# Patient Record
Sex: FEMALE | Race: WHITE | Employment: OTHER | URBAN - METROPOLITAN AREA
[De-identification: names, ages, dates, MRNs, and addresses within clinical notes are randomized per-mention and may not be internally consistent; named-entity substitution may affect disease eponyms.]

---

## 2017-04-18 ENCOUNTER — OFFICE VISIT (OUTPATIENT)
Dept: INFECTIOUS DISEASES | Age: 82
End: 2017-04-18

## 2017-04-18 VITALS
SYSTOLIC BLOOD PRESSURE: 122 MMHG | RESPIRATION RATE: 22 BRPM | HEIGHT: 64 IN | HEART RATE: 73 BPM | TEMPERATURE: 98 F | DIASTOLIC BLOOD PRESSURE: 76 MMHG

## 2017-04-18 DIAGNOSIS — L03.115 CELLULITIS OF RIGHT FOOT: Primary | ICD-10-CM

## 2017-04-18 PROCEDURE — 99213 OFFICE O/P EST LOW 20 MIN: CPT | Performed by: INTERNAL MEDICINE

## 2017-04-18 RX ORDER — AMOXICILLIN 500 MG/1
500 CAPSULE ORAL 3 TIMES DAILY
COMMUNITY
End: 2019-09-19

## 2017-04-18 ASSESSMENT — ENCOUNTER SYMPTOMS
GASTROINTESTINAL NEGATIVE: 1
RESPIRATORY NEGATIVE: 1

## 2017-04-21 LAB
ALBUMIN SERPL-MCNC: 2.7 G/DL
ALP BLD-CCNC: 61 U/L
ALT SERPL-CCNC: 14 U/L
AST SERPL-CCNC: 26 U/L
BILIRUB SERPL-MCNC: 0.5 MG/DL (ref 0.1–1.4)
BUN BLDV-MCNC: 7 MG/DL
CALCIUM SERPL-MCNC: 9.4 MG/DL
CHLORIDE BLD-SCNC: 103 MMOL/L
CO2: 28 MMOL/L
CREAT SERPL-MCNC: 0.56 MG/DL
GFR CALCULATED: NORMAL
GLUCOSE BLD-MCNC: 167 MG/DL
POTASSIUM SERPL-SCNC: 3.9 MMOL/L
SODIUM BLD-SCNC: 139 MMOL/L
TOTAL PROTEIN: 5.4

## 2017-05-02 ENCOUNTER — OFFICE VISIT (OUTPATIENT)
Dept: INFECTIOUS DISEASES | Age: 82
End: 2017-05-02

## 2017-05-02 VITALS
BODY MASS INDEX: 40.63 KG/M2 | HEIGHT: 64 IN | HEART RATE: 64 BPM | TEMPERATURE: 98.1 F | DIASTOLIC BLOOD PRESSURE: 63 MMHG | RESPIRATION RATE: 20 BRPM | WEIGHT: 238 LBS | SYSTOLIC BLOOD PRESSURE: 112 MMHG

## 2017-05-02 DIAGNOSIS — L03.115 CELLULITIS OF RIGHT FOOT: Primary | ICD-10-CM

## 2017-05-02 PROCEDURE — 99213 OFFICE O/P EST LOW 20 MIN: CPT | Performed by: INTERNAL MEDICINE

## 2017-05-02 ASSESSMENT — ENCOUNTER SYMPTOMS
RESPIRATORY NEGATIVE: 1
GASTROINTESTINAL NEGATIVE: 1

## 2017-05-16 ENCOUNTER — OFFICE VISIT (OUTPATIENT)
Dept: INFECTIOUS DISEASES | Age: 82
End: 2017-05-16

## 2017-05-16 VITALS
DIASTOLIC BLOOD PRESSURE: 59 MMHG | RESPIRATION RATE: 16 BRPM | SYSTOLIC BLOOD PRESSURE: 135 MMHG | WEIGHT: 241 LBS | TEMPERATURE: 98 F | HEART RATE: 66 BPM | HEIGHT: 64 IN | BODY MASS INDEX: 41.15 KG/M2

## 2017-05-16 DIAGNOSIS — L03.115 CELLULITIS OF RIGHT FOOT: Primary | ICD-10-CM

## 2017-05-16 PROCEDURE — 99212 OFFICE O/P EST SF 10 MIN: CPT | Performed by: INTERNAL MEDICINE

## 2017-05-16 ASSESSMENT — ENCOUNTER SYMPTOMS
GASTROINTESTINAL NEGATIVE: 1
RESPIRATORY NEGATIVE: 1

## 2017-06-29 ENCOUNTER — OFFICE VISIT (OUTPATIENT)
Dept: INFECTIOUS DISEASES | Age: 82
End: 2017-06-29

## 2017-06-29 VITALS
TEMPERATURE: 98.1 F | DIASTOLIC BLOOD PRESSURE: 65 MMHG | HEART RATE: 74 BPM | HEIGHT: 64 IN | RESPIRATION RATE: 20 BRPM | SYSTOLIC BLOOD PRESSURE: 115 MMHG

## 2017-06-29 DIAGNOSIS — L03.115 CELLULITIS OF RIGHT FOOT: Primary | ICD-10-CM

## 2017-06-29 PROCEDURE — 99213 OFFICE O/P EST LOW 20 MIN: CPT | Performed by: INTERNAL MEDICINE

## 2017-06-29 ASSESSMENT — ENCOUNTER SYMPTOMS
RESPIRATORY NEGATIVE: 1
GASTROINTESTINAL NEGATIVE: 1

## 2017-07-13 ENCOUNTER — OFFICE VISIT (OUTPATIENT)
Dept: INFECTIOUS DISEASES | Age: 82
End: 2017-07-13

## 2017-07-13 VITALS
TEMPERATURE: 98.8 F | RESPIRATION RATE: 20 BRPM | HEIGHT: 64 IN | HEART RATE: 61 BPM | DIASTOLIC BLOOD PRESSURE: 61 MMHG | SYSTOLIC BLOOD PRESSURE: 111 MMHG

## 2017-07-13 DIAGNOSIS — L03.115 CELLULITIS OF RIGHT FOOT: Primary | ICD-10-CM

## 2017-07-13 PROCEDURE — 99213 OFFICE O/P EST LOW 20 MIN: CPT | Performed by: INTERNAL MEDICINE

## 2017-07-13 ASSESSMENT — ENCOUNTER SYMPTOMS
RESPIRATORY NEGATIVE: 1
GASTROINTESTINAL NEGATIVE: 1

## 2017-07-20 ENCOUNTER — TELEPHONE (OUTPATIENT)
Dept: INFECTIOUS DISEASES | Age: 82
End: 2017-07-20

## 2017-07-27 ENCOUNTER — TELEPHONE (OUTPATIENT)
Dept: INFECTIOUS DISEASES | Age: 82
End: 2017-07-27

## 2017-08-03 ENCOUNTER — OFFICE VISIT (OUTPATIENT)
Dept: INFECTIOUS DISEASES | Age: 82
End: 2017-08-03

## 2017-08-03 VITALS
WEIGHT: 228 LBS | SYSTOLIC BLOOD PRESSURE: 123 MMHG | HEIGHT: 64 IN | HEART RATE: 64 BPM | DIASTOLIC BLOOD PRESSURE: 62 MMHG | RESPIRATION RATE: 18 BRPM | BODY MASS INDEX: 38.93 KG/M2 | TEMPERATURE: 97.7 F

## 2017-08-03 DIAGNOSIS — M86.272 SUBACUTE OSTEOMYELITIS OF LEFT FOOT (HCC): Primary | ICD-10-CM

## 2017-08-03 PROCEDURE — 99213 OFFICE O/P EST LOW 20 MIN: CPT | Performed by: INTERNAL MEDICINE

## 2017-08-03 RX ORDER — AMOXICILLIN AND CLAVULANATE POTASSIUM 875; 125 MG/1; MG/1
1 TABLET, FILM COATED ORAL 2 TIMES DAILY
Qty: 60 TABLET | Refills: 0 | Status: SHIPPED | OUTPATIENT
Start: 2017-08-03 | End: 2017-09-02

## 2017-08-03 ASSESSMENT — ENCOUNTER SYMPTOMS
RESPIRATORY NEGATIVE: 1
GASTROINTESTINAL NEGATIVE: 1

## 2017-09-14 ENCOUNTER — OFFICE VISIT (OUTPATIENT)
Dept: INFECTIOUS DISEASES | Age: 82
End: 2017-09-14

## 2017-09-14 VITALS
HEART RATE: 68 BPM | TEMPERATURE: 99.2 F | DIASTOLIC BLOOD PRESSURE: 66 MMHG | WEIGHT: 231.4 LBS | SYSTOLIC BLOOD PRESSURE: 111 MMHG | HEIGHT: 64 IN | RESPIRATION RATE: 20 BRPM | BODY MASS INDEX: 39.5 KG/M2

## 2017-09-14 DIAGNOSIS — M86.272 SUBACUTE OSTEOMYELITIS OF LEFT FOOT (HCC): Primary | ICD-10-CM

## 2017-09-14 PROCEDURE — 99212 OFFICE O/P EST SF 10 MIN: CPT | Performed by: INTERNAL MEDICINE

## 2017-09-14 RX ORDER — AMOXICILLIN AND CLAVULANATE POTASSIUM 875; 125 MG/1; MG/1
1 TABLET, FILM COATED ORAL 2 TIMES DAILY
Qty: 60 TABLET | Refills: 0 | Status: SHIPPED | OUTPATIENT
Start: 2017-09-14 | End: 2017-10-14

## 2017-09-14 ASSESSMENT — ENCOUNTER SYMPTOMS
RESPIRATORY NEGATIVE: 1
GASTROINTESTINAL NEGATIVE: 1

## 2017-09-25 ENCOUNTER — TELEPHONE (OUTPATIENT)
Dept: INFECTIOUS DISEASES | Age: 82
End: 2017-09-25

## 2017-10-02 ENCOUNTER — TELEPHONE (OUTPATIENT)
Dept: INFECTIOUS DISEASES | Age: 82
End: 2017-10-02

## 2017-10-02 NOTE — TELEPHONE ENCOUNTER
Patient states she will need Cataracts surgery, and will need medical clearance. She Will Be Re-checked for C-diff. Reusults are expected to go to PCP,  Of Dr Sammie Power. Dr Edgar Lucero to be updated as needed.

## 2017-11-03 ENCOUNTER — OFFICE VISIT (OUTPATIENT)
Dept: INFECTIOUS DISEASES | Age: 82
End: 2017-11-03

## 2017-11-03 VITALS
SYSTOLIC BLOOD PRESSURE: 132 MMHG | HEIGHT: 64 IN | WEIGHT: 225 LBS | TEMPERATURE: 98 F | RESPIRATION RATE: 22 BRPM | HEART RATE: 68 BPM | DIASTOLIC BLOOD PRESSURE: 64 MMHG | BODY MASS INDEX: 38.41 KG/M2

## 2017-11-03 DIAGNOSIS — S30.0XXD TRAUMATIC HEMATOMA OF BUTTOCK, SUBSEQUENT ENCOUNTER: ICD-10-CM

## 2017-11-03 DIAGNOSIS — L03.317 CELLULITIS OF BUTTOCK, RIGHT: Primary | ICD-10-CM

## 2017-11-03 PROCEDURE — 99213 OFFICE O/P EST LOW 20 MIN: CPT | Performed by: INTERNAL MEDICINE

## 2017-11-03 ASSESSMENT — ENCOUNTER SYMPTOMS
COLOR CHANGE: 0
RESPIRATORY NEGATIVE: 1
GASTROINTESTINAL NEGATIVE: 1

## 2019-09-12 ENCOUNTER — TELEPHONE (OUTPATIENT)
Dept: INFECTIOUS DISEASES | Age: 84
End: 2019-09-12

## 2019-09-12 RX ORDER — CLINDAMYCIN HYDROCHLORIDE 300 MG/1
300 CAPSULE ORAL 3 TIMES DAILY
Qty: 21 CAPSULE | Refills: 0 | Status: SHIPPED | OUTPATIENT
Start: 2019-09-12 | End: 2019-09-19

## 2019-09-12 NOTE — TELEPHONE ENCOUNTER
Per review with Dr Aristides Zee, patient was D/c on Augmentin. Change order to Clindamycin 300 mg, po, TID, #21  Have patient F/u in clinic. Return call to patient, advised to Stop the Augmentin and start  The Clyndamycin. Patient/Patient spouse verbalized understanding. Confirms [facility] on [address] is preferred Phx. Appt to F/u with Dr Aristides Zee is [phone] @ 11:45am.   E-Rx to be sent. Pt advised to check in 2 hours.

## 2019-09-19 ENCOUNTER — OFFICE VISIT (OUTPATIENT)
Dept: INFECTIOUS DISEASES | Age: 84
End: 2019-09-19
Payer: MEDICARE

## 2019-09-19 VITALS
DIASTOLIC BLOOD PRESSURE: 84 MMHG | BODY MASS INDEX: 43.91 KG/M2 | HEART RATE: 87 BPM | RESPIRATION RATE: 18 BRPM | SYSTOLIC BLOOD PRESSURE: 142 MMHG | HEIGHT: 63 IN | TEMPERATURE: 98.3 F | WEIGHT: 247.8 LBS

## 2019-09-19 DIAGNOSIS — L02.211 ABDOMINAL WALL ABSCESS: Primary | ICD-10-CM

## 2019-09-19 PROCEDURE — G8427 DOCREV CUR MEDS BY ELIG CLIN: HCPCS | Performed by: INTERNAL MEDICINE

## 2019-09-19 PROCEDURE — 4040F PNEUMOC VAC/ADMIN/RCVD: CPT | Performed by: INTERNAL MEDICINE

## 2019-09-19 PROCEDURE — G8419 CALC BMI OUT NRM PARAM NOF/U: HCPCS | Performed by: INTERNAL MEDICINE

## 2019-09-19 PROCEDURE — 1123F ACP DISCUSS/DSCN MKR DOCD: CPT | Performed by: INTERNAL MEDICINE

## 2019-09-19 PROCEDURE — 99213 OFFICE O/P EST LOW 20 MIN: CPT | Performed by: INTERNAL MEDICINE

## 2019-09-19 PROCEDURE — 1036F TOBACCO NON-USER: CPT | Performed by: INTERNAL MEDICINE

## 2019-09-19 PROCEDURE — 1090F PRES/ABSN URINE INCON ASSESS: CPT | Performed by: INTERNAL MEDICINE

## 2019-09-19 PROCEDURE — G8400 PT W/DXA NO RESULTS DOC: HCPCS | Performed by: INTERNAL MEDICINE

## 2019-09-19 RX ORDER — UREA 10 %
100 LOTION (ML) TOPICAL DAILY
COMMUNITY

## 2019-09-19 RX ORDER — AMITRIPTYLINE HYDROCHLORIDE 50 MG/1
50 TABLET, FILM COATED ORAL NIGHTLY
COMMUNITY

## 2019-09-19 RX ORDER — LANOLIN ALCOHOL/MO/W.PET/CERES
500 CREAM (GRAM) TOPICAL NIGHTLY
COMMUNITY

## 2019-09-19 RX ORDER — PANTOPRAZOLE SODIUM 40 MG/1
40 TABLET, DELAYED RELEASE ORAL DAILY
COMMUNITY

## 2019-09-19 RX ORDER — SPIRONOLACTONE 25 MG/1
25 TABLET ORAL DAILY
COMMUNITY

## 2019-09-19 RX ORDER — FENTANYL 75 UG/H
1 PATCH TRANSDERMAL
COMMUNITY
End: 2022-02-15

## 2019-09-19 RX ORDER — WARFARIN SODIUM 2 MG/1
2 TABLET ORAL
COMMUNITY

## 2019-09-19 RX ORDER — DONEPEZIL HYDROCHLORIDE 5 MG/1
5 TABLET, FILM COATED ORAL NIGHTLY
COMMUNITY
End: 2021-02-09 | Stop reason: DRUGHIGH

## 2019-09-19 ASSESSMENT — ENCOUNTER SYMPTOMS
ABDOMINAL PAIN: 1
RESPIRATORY NEGATIVE: 1

## 2019-11-08 ENCOUNTER — OFFICE VISIT (OUTPATIENT)
Dept: INFECTIOUS DISEASES | Age: 84
End: 2019-11-08
Payer: MEDICARE

## 2019-11-08 VITALS
DIASTOLIC BLOOD PRESSURE: 73 MMHG | HEART RATE: 68 BPM | TEMPERATURE: 97.9 F | HEIGHT: 63 IN | BODY MASS INDEX: 43.9 KG/M2 | SYSTOLIC BLOOD PRESSURE: 143 MMHG | RESPIRATION RATE: 18 BRPM

## 2019-11-08 DIAGNOSIS — I48.11 LONGSTANDING PERSISTENT ATRIAL FIBRILLATION (HCC): ICD-10-CM

## 2019-11-08 DIAGNOSIS — S70.12XD THIGH HEMATOMA, LEFT, SUBSEQUENT ENCOUNTER: ICD-10-CM

## 2019-11-08 DIAGNOSIS — L03.116 LEFT LEG CELLULITIS: Primary | ICD-10-CM

## 2019-11-08 PROCEDURE — G8417 CALC BMI ABV UP PARAM F/U: HCPCS | Performed by: INTERNAL MEDICINE

## 2019-11-08 PROCEDURE — G8400 PT W/DXA NO RESULTS DOC: HCPCS | Performed by: INTERNAL MEDICINE

## 2019-11-08 PROCEDURE — G8484 FLU IMMUNIZE NO ADMIN: HCPCS | Performed by: INTERNAL MEDICINE

## 2019-11-08 PROCEDURE — 1036F TOBACCO NON-USER: CPT | Performed by: INTERNAL MEDICINE

## 2019-11-08 PROCEDURE — 99213 OFFICE O/P EST LOW 20 MIN: CPT | Performed by: INTERNAL MEDICINE

## 2019-11-08 PROCEDURE — G8427 DOCREV CUR MEDS BY ELIG CLIN: HCPCS | Performed by: INTERNAL MEDICINE

## 2019-11-08 PROCEDURE — 4040F PNEUMOC VAC/ADMIN/RCVD: CPT | Performed by: INTERNAL MEDICINE

## 2019-11-08 PROCEDURE — 1123F ACP DISCUSS/DSCN MKR DOCD: CPT | Performed by: INTERNAL MEDICINE

## 2019-11-08 PROCEDURE — 1090F PRES/ABSN URINE INCON ASSESS: CPT | Performed by: INTERNAL MEDICINE

## 2019-11-08 ASSESSMENT — ENCOUNTER SYMPTOMS: COLOR CHANGE: 1

## 2020-02-24 PROBLEM — K25.9 GASTRIC ULCER, UNSPECIFIED AS ACUTE OR CHRONIC, WITHOUT HEMORRHAGE OR PERFORATION: Status: ACTIVE | Noted: 2018-09-03

## 2020-02-24 PROBLEM — I25.118 ATHSCL HEART DISEASE OF NATIVE COR ART W OTH ANG PCTRS (HCC): Status: ACTIVE | Noted: 2018-10-18

## 2020-02-24 PROBLEM — Z91.041 RADIOGRAPHIC DYE ALLERGY STATUS: Status: ACTIVE | Noted: 2018-10-22

## 2020-02-24 PROBLEM — E66.9 OBESITY, UNSPECIFIED: Status: ACTIVE | Noted: 2018-10-18

## 2020-02-24 PROBLEM — R21 RASH AND OTHER NONSPECIFIC SKIN ERUPTION: Status: ACTIVE | Noted: 2018-09-03

## 2020-02-24 PROBLEM — Z83.49 FAMILY HISTORY OF OTHER ENDOCRINE, NUTRITIONAL AND METABOLIC DISEASES: Status: ACTIVE | Noted: 2018-09-03

## 2020-02-24 PROBLEM — Q20.8 CARDIAC SEGMENTAL CONFIGURATION WITH ATRIAL CONFIGURATION UNKNOWN, VENTRICULAR CONFIGURATION UNKNOWN, AND INVERTED NORMALLY ALIGNED GREAT ARTERIES: Status: ACTIVE | Noted: 2018-10-20

## 2020-02-24 PROBLEM — I50.9 HEART FAILURE, UNSPECIFIED (HCC): Status: ACTIVE | Noted: 2018-10-22

## 2020-02-24 PROBLEM — N94.9 UNSPECIFIED CONDITION ASSOCIATED WITH FEMALE GENITAL ORGANS AND MENSTRUAL CYCLE: Status: ACTIVE | Noted: 2018-05-03

## 2020-02-24 PROBLEM — T82.855A STENOSIS OF CORONARY ARTERY STENT: Status: ACTIVE | Noted: 2018-10-22

## 2020-02-24 PROBLEM — Z82.49 FAMILY HISTORY OF ISCHEMIC HEART DISEASE AND OTHER DISEASES OF THE CIRCULATORY SYSTEM: Status: ACTIVE | Noted: 2018-10-18

## 2020-02-24 PROBLEM — Z45.018 ENCOUNTER FOR ADJUST AND MGMT OTH PRT CARDIAC PACEMAKER: Status: ACTIVE | Noted: 2018-09-11

## 2020-02-24 PROBLEM — Z96.653 PRESENCE OF ARTIFICIAL KNEE JOINT, BILATERAL: Status: ACTIVE | Noted: 2018-09-03

## 2020-02-24 PROBLEM — L29.9 PRURITUS, UNSPECIFIED: Status: ACTIVE | Noted: 2018-09-03

## 2020-02-24 PROBLEM — N28.9 DISORDER OF KIDNEY AND URETER, UNSPECIFIED: Status: ACTIVE | Noted: 2018-10-18

## 2020-02-24 PROBLEM — R82.998 OTHER ABNORMAL FINDINGS IN URINE: Status: ACTIVE | Noted: 2018-12-31

## 2020-02-24 PROBLEM — Z85.9 PERSONAL HISTORY OF MALIGNANT NEOPLASM, UNSPECIFIED: Status: ACTIVE | Noted: 2018-09-03

## 2020-02-24 PROBLEM — E66.01 MORBID (SEVERE) OBESITY DUE TO EXCESS CALORIES (HCC): Status: ACTIVE | Noted: 2018-10-22

## 2020-02-24 PROBLEM — R23.3 SPONTANEOUS ECCHYMOSES: Status: ACTIVE | Noted: 2018-09-27

## 2020-02-24 PROBLEM — M54.9 BACK PAIN: Status: ACTIVE | Noted: 2018-10-20

## 2020-02-24 PROBLEM — Z45.010 ENCOUNTER FOR CHECKING AND TESTING OF CARDIAC PACEMAKER PULSE GENERATOR (BATTERY): Status: ACTIVE | Noted: 2019-03-12

## 2020-02-24 PROBLEM — Z79.899 OTHER LONG TERM (CURRENT) DRUG THERAPY: Status: ACTIVE | Noted: 2018-12-03

## 2020-02-24 PROBLEM — L98.9 DISORDER OF THE SKIN AND SUBCUTANEOUS TISSUE, UNSPECIFIED: Status: ACTIVE | Noted: 2018-09-03

## 2020-02-24 PROBLEM — M54.9 DORSALGIA, UNSPECIFIED: Status: ACTIVE | Noted: 2018-10-18

## 2020-02-24 PROBLEM — G89.29 CHRONIC PAIN: Status: ACTIVE | Noted: 2018-09-03

## 2020-02-24 PROBLEM — Q20.9 CARDIAC SEGMENTAL CONFIGURATION WITH ATRIAL CONFIGURATION UNKNOWN, VENTRICULAR CONFIGURATION UNKNOWN, AND INVERTED NORMALLY ALIGNED GREAT ARTERIES: Status: ACTIVE | Noted: 2018-10-20

## 2020-02-24 PROBLEM — Z88.2 ALLERGY STATUS TO SULFONAMIDES STATUS: Status: ACTIVE | Noted: 2018-09-03

## 2020-02-24 PROBLEM — I25.10 ATHEROSCLEROSIS OF NATIVE CORONARY ARTERY OF NATIVE HEART WITHOUT ANGINA PECTORIS: Status: ACTIVE | Noted: 2018-10-18

## 2020-02-24 PROBLEM — M41.9 SCOLIOSIS, UNSPECIFIED: Status: ACTIVE | Noted: 2018-10-18

## 2020-02-24 PROBLEM — Z79.4 LONG TERM (CURRENT) USE OF INSULIN (HCC): Status: ACTIVE | Noted: 2018-10-22

## 2020-02-24 PROBLEM — M19.90 UNSPECIFIED OSTEOARTHRITIS, UNSPECIFIED SITE: Status: ACTIVE | Noted: 2018-10-18

## 2020-02-24 PROBLEM — Q89.8 OTHER SPECIFIED CONGENITAL MALFORMATIONS: Status: ACTIVE | Noted: 2018-09-03

## 2020-02-24 PROBLEM — I11.0 HYPERTENSIVE HEART DISEASE WITH HEART FAILURE (HCC): Status: ACTIVE | Noted: 2018-09-03

## 2020-02-24 PROBLEM — M25.569 PAIN IN UNSPECIFIED KNEE: Status: ACTIVE | Noted: 2018-05-03

## 2020-02-24 PROBLEM — G47.33 OBSTRUCTIVE SLEEP APNEA SYNDROME: Status: ACTIVE | Noted: 2018-10-18

## 2020-02-24 PROBLEM — Q05.9: Status: ACTIVE | Noted: 2018-10-18

## 2020-02-25 ENCOUNTER — OFFICE VISIT (OUTPATIENT)
Dept: NEUROLOGY | Age: 85
End: 2020-02-25
Payer: MEDICARE

## 2020-02-25 VITALS — SYSTOLIC BLOOD PRESSURE: 108 MMHG | DIASTOLIC BLOOD PRESSURE: 72 MMHG | BODY MASS INDEX: 42.09 KG/M2 | WEIGHT: 237.6 LBS

## 2020-02-25 PROBLEM — M54.16 LUMBAR RADICULOPATHY: Status: ACTIVE | Noted: 2020-02-25

## 2020-02-25 PROBLEM — R05.9 COUGH: Status: ACTIVE | Noted: 2019-01-22

## 2020-02-25 PROBLEM — R55 SYNCOPE AND COLLAPSE: Status: ACTIVE | Noted: 2019-03-12

## 2020-02-25 PROBLEM — Z91.89 AT RISK FOR IMPAIRED SKIN INTEGRITY: Status: ACTIVE | Noted: 2020-02-25

## 2020-02-25 PROBLEM — R07.2 PRECORDIAL PAIN: Status: ACTIVE | Noted: 2018-10-18

## 2020-02-25 PROBLEM — Z96.1 PSEUDOPHAKIA: Status: ACTIVE | Noted: 2018-06-15

## 2020-02-25 PROBLEM — Z98.890 HISTORY OF MOHS MICROGRAPHIC SURGERY FOR SKIN CANCER: Status: ACTIVE | Noted: 2018-07-12

## 2020-02-25 PROBLEM — Z86.79 HISTORY OF ATRIAL FIBRILLATION: Status: ACTIVE | Noted: 2018-06-08

## 2020-02-25 PROBLEM — I48.0 PAROXYSMAL ATRIAL FIBRILLATION (HCC): Status: ACTIVE | Noted: 2019-03-12

## 2020-02-25 PROBLEM — H50.15 ALTERNATING EXOTROPIA: Status: ACTIVE | Noted: 2017-09-25

## 2020-02-25 PROBLEM — E11.9 TYPE 2 DIABETES MELLITUS WITHOUT RETINOPATHY (HCC): Status: ACTIVE | Noted: 2017-09-25

## 2020-02-25 PROBLEM — L70.0 CLOSED COMEDONE: Status: ACTIVE | Noted: 2017-04-27

## 2020-02-25 PROBLEM — R41.3 MEMORY LOSS: Status: ACTIVE | Noted: 2020-02-25

## 2020-02-25 PROBLEM — H43.813 POSTERIOR VITREOUS DETACHMENT OF BOTH EYES: Status: ACTIVE | Noted: 2017-09-25

## 2020-02-25 PROBLEM — H02.833 DERMATOCHALASIS OF EYELIDS OF BOTH EYES: Status: ACTIVE | Noted: 2017-09-25

## 2020-02-25 PROBLEM — Z91.81 HISTORY OF FALLING: Status: ACTIVE | Noted: 2018-05-23

## 2020-02-25 PROBLEM — G62.9 PERIPHERAL NEUROPATHY: Status: ACTIVE | Noted: 2020-02-25

## 2020-02-25 PROBLEM — Z79.01 ANTICOAGULATED ON COUMADIN: Status: ACTIVE | Noted: 2018-06-08

## 2020-02-25 PROBLEM — Z85.828 HISTORY OF MOHS MICROGRAPHIC SURGERY FOR SKIN CANCER: Status: ACTIVE | Noted: 2018-07-12

## 2020-02-25 PROBLEM — Z95.0 PRESENCE OF CARDIAC PACEMAKER: Status: ACTIVE | Noted: 2018-06-08

## 2020-02-25 PROBLEM — G31.84 MILD COGNITIVE IMPAIRMENT WITH MEMORY LOSS: Status: ACTIVE | Noted: 2020-02-25

## 2020-02-25 PROBLEM — H02.836 DERMATOCHALASIS OF EYELIDS OF BOTH EYES: Status: ACTIVE | Noted: 2017-09-25

## 2020-02-25 PROBLEM — E87.6 HYPOKALEMIA: Status: ACTIVE | Noted: 2018-12-24

## 2020-02-25 PROBLEM — Z72.0 CURRENT TOBACCO USE: Status: ACTIVE | Noted: 2020-02-25

## 2020-02-25 PROCEDURE — G8427 DOCREV CUR MEDS BY ELIG CLIN: HCPCS | Performed by: PSYCHIATRY & NEUROLOGY

## 2020-02-25 PROCEDURE — G8417 CALC BMI ABV UP PARAM F/U: HCPCS | Performed by: PSYCHIATRY & NEUROLOGY

## 2020-02-25 PROCEDURE — 1090F PRES/ABSN URINE INCON ASSESS: CPT | Performed by: PSYCHIATRY & NEUROLOGY

## 2020-02-25 PROCEDURE — 99214 OFFICE O/P EST MOD 30 MIN: CPT | Performed by: PSYCHIATRY & NEUROLOGY

## 2020-02-25 PROCEDURE — G8400 PT W/DXA NO RESULTS DOC: HCPCS | Performed by: PSYCHIATRY & NEUROLOGY

## 2020-02-25 PROCEDURE — 4040F PNEUMOC VAC/ADMIN/RCVD: CPT | Performed by: PSYCHIATRY & NEUROLOGY

## 2020-02-25 PROCEDURE — 1036F TOBACCO NON-USER: CPT | Performed by: PSYCHIATRY & NEUROLOGY

## 2020-02-25 PROCEDURE — G8484 FLU IMMUNIZE NO ADMIN: HCPCS | Performed by: PSYCHIATRY & NEUROLOGY

## 2020-02-25 PROCEDURE — 1123F ACP DISCUSS/DSCN MKR DOCD: CPT | Performed by: PSYCHIATRY & NEUROLOGY

## 2020-02-25 RX ORDER — NIACIN 500 MG/1
500 TABLET, EXTENDED RELEASE ORAL
COMMUNITY
Start: 2017-04-24

## 2020-02-25 ASSESSMENT — ENCOUNTER SYMPTOMS
TROUBLE SWALLOWING: 0
SHORTNESS OF BREATH: 0
PHOTOPHOBIA: 0
VOMITING: 0
NAUSEA: 0
CHOKING: 0
BACK PAIN: 1

## 2020-02-25 NOTE — PROGRESS NOTES
Subjective:      Patient ID: Karol Damian is a 82 y.o. female who presents today for:  Chief Complaint   Patient presents with    6 Month Follow-Up     Memory is not doing good. Neuropathy isnt going to well either her feet are still having the numbness feeling and to the point the feel like they are pounding.  Other     patient is going through some rough times she has found out her son has cancer. HPI 82 right-handed female with  history of memory loss  And lumbarradiculopathy and PN . Slowly continues to decline over time. 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 Patient was on high-dose Duragesic patch 100 mcg and high dose of gabapentin which may be contributing to some of the cognition in this age group. Since she is no longer a Duragesic patch. She is on some pain medication but does not appear to be narcotics. She has not declined. She has good days and bad days. She still requires much help at home intermittently. She has not any falls but her legs become weaker and weaker from her neuropathy. Her sugars are better controlled. She is not any hospitalizations of recent. No past medical history on file. No past surgical history on file.   Social History     Socioeconomic History    Marital status:      Spouse name: Not on file    Number of children: Not on file    Years of education: Not on file    Highest education level: Not on file   Occupational History    Not on file   Social Needs    Financial resource strain: Not on file    Food insecurity:     Worry: Not on file     Inability: Not on file    Transportation needs:     Medical: Not on file     Non-medical: Not on file   Tobacco Use    Smoking status: Never Smoker    Smokeless tobacco: Never Used   Substance and Sexual Activity    Alcohol use: Not on file    Drug use: Not on file    Sexual activity: Not on file   Lifestyle    Physical activity:     Days per week: hallucinations and sleep disturbance. Objective:   /72 (Site: Right Upper Arm, Position: Sitting, Cuff Size: Large Adult)   Wt 237 lb 9.6 oz (107.8 kg)   BMI 42.09 kg/m²     Physical Exam  Vitals signs reviewed. Eyes:      Pupils: Pupils are equal, round, and reactive to light. Neck:      Musculoskeletal: Normal range of motion. Cardiovascular:      Rate and Rhythm: Normal rate and regular rhythm. Heart sounds: No murmur. Pulmonary:      Effort: Pulmonary effort is normal.      Breath sounds: Normal breath sounds. Musculoskeletal: Normal range of motion. Neurological:      Mental Status: She is alert and oriented to person, place, and time. Cranial Nerves: No cranial nerve deficit. Sensory: No sensory deficit. Motor: No abnormal muscle tone. Coordination: Coordination normal.      Deep Tendon Reflexes: Reflexes are normal and symmetric. Babinski sign absent on the right side. Babinski sign absent on the left side. Addition to the above patient has 2+ pedal edema she is areflexic in the lower extremity lower extremity strength is 4/5 she walks with a walker. She is no vision in the left eye. No results found.     Lab Results   Component Value Date    WBC 8.3 10/23/2019    RBC 4.19 10/23/2019    HGB 13.9 10/23/2019    HCT 41.8 10/23/2019    MCV 99.7 10/23/2019    MCH 33.3 10/23/2019    MCHC 33.4 10/23/2019    RDW 13.1 10/23/2019     10/23/2019     Lab Results   Component Value Date     10/23/2019    K 4.0 10/23/2019    CL 97 10/23/2019    CO2 24 10/23/2019    BUN 16 10/23/2019    CREATININE 0.76 10/23/2019    GFRAA >60.0 10/23/2019    LABGLOM >60.0 10/23/2019    GLUCOSE 272 10/23/2019    LABALBU 2.7 04/21/2017    CALCIUM 9.9 10/23/2019    BILITOT 0.5 04/21/2017    ALKPHOS 61 04/21/2017    AST 26 04/21/2017    ALT 14 04/21/2017     Lab Results   Component Value Date    PROTIME 23.8 10/30/2019    INR 2.0 10/30/2019     No results found for: TSH,

## 2020-03-26 PROBLEM — R05.9 COUGH: Status: RESOLVED | Noted: 2019-01-22 | Resolved: 2020-03-26

## 2020-08-25 ENCOUNTER — OFFICE VISIT (OUTPATIENT)
Dept: NEUROLOGY | Age: 85
End: 2020-08-25
Payer: MEDICARE

## 2020-08-25 VITALS — DIASTOLIC BLOOD PRESSURE: 70 MMHG | SYSTOLIC BLOOD PRESSURE: 130 MMHG

## 2020-08-25 PROBLEM — R26.0 ATAXIC GAIT: Status: ACTIVE | Noted: 2020-08-25

## 2020-08-25 PROCEDURE — 99214 OFFICE O/P EST MOD 30 MIN: CPT | Performed by: PSYCHIATRY & NEUROLOGY

## 2020-08-25 PROCEDURE — 4040F PNEUMOC VAC/ADMIN/RCVD: CPT | Performed by: PSYCHIATRY & NEUROLOGY

## 2020-08-25 PROCEDURE — 1123F ACP DISCUSS/DSCN MKR DOCD: CPT | Performed by: PSYCHIATRY & NEUROLOGY

## 2020-08-25 PROCEDURE — 1090F PRES/ABSN URINE INCON ASSESS: CPT | Performed by: PSYCHIATRY & NEUROLOGY

## 2020-08-25 PROCEDURE — G8400 PT W/DXA NO RESULTS DOC: HCPCS | Performed by: PSYCHIATRY & NEUROLOGY

## 2020-08-25 PROCEDURE — 1036F TOBACCO NON-USER: CPT | Performed by: PSYCHIATRY & NEUROLOGY

## 2020-08-25 PROCEDURE — G8427 DOCREV CUR MEDS BY ELIG CLIN: HCPCS | Performed by: PSYCHIATRY & NEUROLOGY

## 2020-08-25 PROCEDURE — G8417 CALC BMI ABV UP PARAM F/U: HCPCS | Performed by: PSYCHIATRY & NEUROLOGY

## 2020-08-25 RX ORDER — OXYCODONE HYDROCHLORIDE 15 MG/1
TABLET ORAL
COMMUNITY
Start: 2020-08-18 | End: 2021-02-09 | Stop reason: DRUGHIGH

## 2020-08-25 RX ORDER — BLOOD SUGAR DIAGNOSTIC
STRIP MISCELLANEOUS
COMMUNITY
Start: 2020-06-30

## 2020-08-25 RX ORDER — ROSUVASTATIN CALCIUM 5 MG/1
TABLET, COATED ORAL
COMMUNITY
Start: 2020-07-13

## 2020-08-25 RX ORDER — MEMANTINE HYDROCHLORIDE 5 MG/1
5 TABLET ORAL 2 TIMES DAILY
Qty: 60 TABLET | Refills: 3 | Status: SHIPPED | OUTPATIENT
Start: 2020-08-25 | End: 2020-12-28 | Stop reason: SDUPTHER

## 2020-08-25 RX ORDER — TRIAMCINOLONE ACETONIDE 1 MG/G
CREAM TOPICAL
COMMUNITY
Start: 2019-11-21

## 2020-08-25 ASSESSMENT — ENCOUNTER SYMPTOMS
CHOKING: 0
VOMITING: 0
SHORTNESS OF BREATH: 0
TROUBLE SWALLOWING: 0
PHOTOPHOBIA: 0
NAUSEA: 0
COLOR CHANGE: 0
BACK PAIN: 0

## 2020-08-25 NOTE — PROGRESS NOTES
Subjective:      Patient ID: Fede Corrales is a 82 y.o. female who presents today for:  Chief Complaint   Patient presents with    Follow-up     Patient states she feels like her memory is getting worse. She will try to talk to people and she will have a hard time getting the words out and other times she just doesnt know what she is going to say. She is forgetting names more often as well. HPI 82 a right-handed female history of mild cognitive impairment dementia. The patient actually is now entering a phase of early dementia with some worsening. Patient is only on Aricept. She also has sleep apnea which may be contributing to some of her memory issues as we have seen in the of her patients. Patient is still able to perform her activities of daily living. Issues appears to mostly medical she is declining. She is on Coumadin and had some blood vessels in her eyes that may have broken. She still able to see there is mild degree of chemosis. He walks with a cane. She has neuropathy and has Charcot points as well. She walks with a cane she has not had any falls injuries trauma no bleeding bruising choking drooling    No past medical history on file. No past surgical history on file.   Social History     Socioeconomic History    Marital status:      Spouse name: Not on file    Number of children: Not on file    Years of education: Not on file    Highest education level: Not on file   Occupational History    Not on file   Social Needs    Financial resource strain: Not on file    Food insecurity     Worry: Not on file     Inability: Not on file    Transportation needs     Medical: Not on file     Non-medical: Not on file   Tobacco Use    Smoking status: Never Smoker    Smokeless tobacco: Never Used   Substance and Sexual Activity    Alcohol use: Not on file    Drug use: Not on file    Sexual activity: Not on file   Lifestyle    Physical activity     Days per week: Not on file Minutes per session: Not on file    Stress: Not on file   Relationships    Social connections     Talks on phone: Not on file     Gets together: Not on file     Attends [employer] service: Not on file     Active member of club or organization: Not on file     Attends meetings of clubs or organizations: Not on file     Relationship status: Not on file    Intimate partner violence     Fear of current or ex partner: Not on file     Emotionally abused: Not on file     Physically abused: Not on file     Forced sexual activity: Not on file   Other Topics Concern    Not on file   Social History Narrative    Not on file     No family history on file. Allergies   Allergen Reactions    Amlodipine Besylate Other (See Comments)     chest pain    Amoxicillin     Cephalexin      Other reaction(s): Unknown    Clobetasol     Codeine      Other reaction(s): GI Upset    Diltiazem     Dipivefrin     Dye [Iodides]     Iodine     Lipitor [Atorvastatin]     Lisinopril     Lotensin [Benazepril Hcl]     Lovastatin     Mobic     Morphine      Other reaction(s): GI Upset    Naproxen     Nickel     Nifedipine     Novolin 70-30 [Insulin Nph Isophane & Regular]     Sulfa Antibiotics     Tramadol     Vancomycin     Vioxx [Rofecoxib]     Cephalosporins Rash       Current Outpatient Medications   Medication Sig Dispense Refill    rosuvastatin (CRESTOR) 5 MG tablet       triamcinolone (KENALOG) 0.1 % cream Apply to lower legs twice daily and cover with moisturizer.       CPAP Machine MISC 13 cm      niacin (NIASPAN) 500 MG extended release tablet Take 500 mg by mouth      vitamin B-12 (CYANOCOBALAMIN) 100 MCG tablet Take 100 mcg by mouth daily      warfarin (COUMADIN) 2 MG tablet Take 2 mg by mouth      insulin 70-30 (NOVOLIN 70/30) (70-30) 100 UNIT per ML injection vial Inject into the skin 2 times daily      pantoprazole (PROTONIX) 40 MG tablet Take 40 mg by mouth daily      niacin (SLO-NIACIN) 500 MG extended release tablet Take 500 mg by mouth nightly      Probiotic Product (PROBIOTIC DAILY PO) Take by mouth      amitriptyline (ELAVIL) 50 MG tablet Take 50 mg by mouth nightly      donepezil (ARICEPT) 5 MG tablet Take 5 mg by mouth nightly      spironolactone (ALDACTONE) 25 MG tablet Take 25 mg by mouth daily      potassium chloride SA (K-DUR;KLOR-CON) 20 MEQ tablet Take 20 mEq by mouth 2 times daily.  furosemide (LASIX) 40 MG tablet Take 80 mg by mouth 2 times daily       clopidogrel (PLAVIX) 75 MG tablet Take 75 mg by mouth daily.  metoprolol (TOPROL-XL) 100 MG XL tablet Take 100 mg by mouth daily.  gabapentin (NEURONTIN) 400 MG capsule Take 400 mg by mouth 3 times daily.  metolazone (ZAROXOLYN) 5 MG tablet Take 5 mg by mouth daily.  Lactobacillus (ACIDOPHILUS PO) Take  by mouth.  Cholecalciferol (VITAMIN D3) 1000 UNITS TABS Take  by mouth.  nitroGLYCERIN (NITROSTAT) 0.4 MG SL tablet Place 0.4 mg under the tongue every 5 minutes as needed.  insulin NPH (HUMULIN N) 100 UNIT/ML injection Inject  into the skin 2 times daily (before meals).  ONETOUCH ULTRA strip TEST 4 TIMES A DAY      oxyCODONE (OXY-IR) 15 MG immediate release tablet       fentaNYL (DURAGESIC) 75 MCG/HR Place 1 patch onto the skin every 72 hours.  aspirin 81 MG tablet Take 81 mg by mouth daily. No current facility-administered medications for this visit. Review of Systems   Constitutional: Negative for fever. HENT: Negative for ear pain, tinnitus and trouble swallowing. Eyes: Negative for photophobia and visual disturbance. Respiratory: Negative for choking and shortness of breath. Cardiovascular: Negative for chest pain and palpitations. Gastrointestinal: Negative for nausea and vomiting. Musculoskeletal: Negative for back pain, gait problem, joint swelling, myalgias, neck pain and neck stiffness. Skin: Negative for color change. Allergic/Immunologic: Negative for food allergies. Neurological: Negative for dizziness, tremors, seizures, syncope, facial asymmetry, speech difficulty, weakness, light-headedness, numbness and headaches. Psychiatric/Behavioral: Negative for behavioral problems, confusion, hallucinations and sleep disturbance. Objective:   /70 (Site: Left Upper Arm, Position: Sitting, Cuff Size: Large Adult)     Physical Exam  Vitals signs reviewed. Eyes:      Pupils: Pupils are equal, round, and reactive to light. Neck:      Musculoskeletal: Normal range of motion. Cardiovascular:      Rate and Rhythm: Normal rate and regular rhythm. Heart sounds: No murmur. Pulmonary:      Effort: Pulmonary effort is normal.      Breath sounds: Normal breath sounds. Abdominal:      General: Bowel sounds are normal.   Musculoskeletal: Normal range of motion. Skin:     General: Skin is warm. Neurological:      Mental Status: She is alert and oriented to person, place, and time. Cranial Nerves: No cranial nerve deficit. Sensory: No sensory deficit. Motor: Weakness present. No abnormal muscle tone. Coordination: Coordination normal.      Deep Tendon Reflexes: Babinski sign absent on the right side. Babinski sign absent on the left side. Reflex Scores:       Tricep reflexes are 0 on the right side and 0 on the left side. Bicep reflexes are 0 on the right side and 0 on the left side. Brachioradialis reflexes are 0 on the right side and 0 on the left side. Patellar reflexes are 0 on the right side and 0 on the left side. Achilles reflexes are 0 on the right side and 0 on the left side. Psychiatric:         Mood and Affect: Mood normal.       Walks with a cane and she is completely areflexic in the lower extremities some myopathy changes with proximal muscle weakness no results found.     Lab Results   Component Value Date    WBC 8.3 10/23/2019    RBC 4.19 10/23/2019 HGB 13.9 10/23/2019    HCT 41.8 10/23/2019    MCV 99.7 10/23/2019    MCH 33.3 10/23/2019    MCHC 33.4 10/23/2019    RDW 13.1 10/23/2019     10/23/2019     Lab Results   Component Value Date     10/23/2019    K 4.0 10/23/2019    CL 97 10/23/2019    CO2 24 10/23/2019    BUN 16 10/23/2019    CREATININE 0.76 10/23/2019    GFRAA >60.0 10/23/2019    LABGLOM >60.0 10/23/2019    GLUCOSE 272 10/23/2019    LABALBU 2.7 04/21/2017    CALCIUM 9.9 10/23/2019    BILITOT 0.5 04/21/2017    ALKPHOS 61 04/21/2017    AST 26 04/21/2017    ALT 14 04/21/2017     Lab Results   Component Value Date    PROTIME 23.8 10/30/2019    INR 2.0 10/30/2019     No results found for: TSH, [id number], FOLATE, FERRITIN, IRON, TIBC, PTRFSAT, TSH, FREET4  No results found for: TRIG, HDL, LDLCALC, LDLDIRECT, LABVLDL  No results found for: LABAMPH, BARBSCNU, LABBENZ, CANNAB, COCAINESCRN, LABMETH, OPIATESCREENURINE, PHENCYCLIDINESCREENURINE, PPXUR, ETOH  No results found for: LITHIUM, DILFRTOT, VALPROATE    Assessment:       Diagnosis Orders   1. Mild cognitive impairment with memory loss     2. Ataxic gait     3. Lumbar radiculopathy     4. Diabetic polyneuropathy associated with type 1 diabetes mellitus (HCC)       Mild Cognitive impairment going into early dementia. Patient is worsening terms of her memory but her functional status has remained normal.  Is time to add Namenda to the Aricept. We will keep her on the lowest doses for now given that she is on polypharmacy. Patient has significant peripheral neuropathy with gait ataxia and Charcot joints. She has diabetes she walks with a cane she is not any falls injuries or trauma. Her  helps her to some degree but mostly she can do things on herself except the medication part of it. Main issues appears to be sleep apnea she does not sleep very well and is likely affecting her memory as well.   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 Plan:      No orders of the defined types were placed in this encounter. No orders of the defined types were placed in this encounter. No follow-ups on file.       Braxton Clinton MD

## 2020-12-28 RX ORDER — MEMANTINE HYDROCHLORIDE 5 MG/1
5 TABLET ORAL 2 TIMES DAILY
Qty: 60 TABLET | Refills: 3 | Status: SHIPPED | OUTPATIENT
Start: 2020-12-28 | End: 2022-09-12 | Stop reason: SDUPTHER

## 2020-12-28 NOTE — TELEPHONE ENCOUNTER
Requested Prescriptions     Pending Prescriptions Disp Refills    memantine (NAMENDA) 5 MG tablet 60 tablet 3     Sig: Take 1 tablet by mouth 2 times daily

## 2021-02-05 PROBLEM — M25.569 KNEE PAIN: Status: ACTIVE | Noted: 2021-02-05

## 2021-02-05 PROBLEM — Z87.42 HISTORY OF RECURRENT VAGINAL DISCHARGE: Status: ACTIVE | Noted: 2021-02-05

## 2021-02-05 PROBLEM — Z87.19 H/O: GASTROINTESTINAL DISEASE: Status: ACTIVE | Noted: 2021-02-05

## 2021-02-05 PROBLEM — L30.9 DERMATITIS OF VULVA: Status: ACTIVE | Noted: 2021-02-05

## 2021-02-05 PROBLEM — R82.90 FOUL SMELLING URINE: Status: ACTIVE | Noted: 2018-12-31

## 2021-02-05 PROBLEM — H53.9 DISORDER OF VISION: Status: ACTIVE | Noted: 2021-02-05

## 2021-02-05 PROBLEM — N90.5 ATROPHIC VULVA: Status: ACTIVE | Noted: 2021-02-05

## 2021-02-05 PROBLEM — K31.84 GASTROPARESIS: Status: ACTIVE | Noted: 2021-02-05

## 2021-02-05 PROBLEM — K64.9 HEMORRHOIDS: Status: ACTIVE | Noted: 2021-02-05

## 2021-02-05 PROBLEM — G89.4 CHRONIC PAIN DISORDER: Status: ACTIVE | Noted: 2018-10-18

## 2021-02-05 PROBLEM — R49.0 CHRONIC HOARSENESS: Status: ACTIVE | Noted: 2021-02-05

## 2021-02-05 PROBLEM — J44.9 MILD CHRONIC OBSTRUCTIVE PULMONARY DISEASE (HCC): Status: ACTIVE | Noted: 2018-10-22

## 2021-02-05 PROBLEM — M86.9 OSTEOMYELITIS OF LEFT FOOT (HCC): Status: ACTIVE | Noted: 2021-02-05

## 2021-02-05 PROBLEM — I25.10 ARTERIOSCLEROSIS OF CORONARY ARTERY: Status: ACTIVE | Noted: 2018-10-18

## 2021-02-05 PROBLEM — Z85.9 PERSONAL HISTORY OF MALIGNANT NEOPLASM: Status: ACTIVE | Noted: 2021-02-05

## 2021-02-05 PROBLEM — L97.509 CHRONIC FOOT ULCER (HCC): Status: ACTIVE | Noted: 2021-02-05

## 2021-02-05 PROBLEM — L29.2 PRURITUS OF VULVA: Status: ACTIVE | Noted: 2021-02-05

## 2021-02-05 PROBLEM — Z87.898 H/O VERTIGO: Status: ACTIVE | Noted: 2021-02-05

## 2021-02-05 PROBLEM — N89.8 VAGINAL IRRITATION: Status: ACTIVE | Noted: 2021-02-05

## 2021-02-05 PROBLEM — Z86.39 H/O: OBESITY: Status: ACTIVE | Noted: 2021-02-05

## 2021-02-05 PROBLEM — S50.00XA CONTUSION OF ELBOW: Status: ACTIVE | Noted: 2021-02-05

## 2021-02-05 PROBLEM — Z87.09 H/O BRONCHITIS: Status: ACTIVE | Noted: 2021-02-05

## 2021-02-05 PROBLEM — R68.82 REDUCED LIBIDO: Status: ACTIVE | Noted: 2021-02-05

## 2021-02-05 PROBLEM — Z86.72 HISTORY OF THROMBOPHLEBITIS: Status: ACTIVE | Noted: 2021-02-05

## 2021-02-05 PROBLEM — R60.0 LOCALIZED EDEMA: Status: ACTIVE | Noted: 2021-02-05

## 2021-02-05 PROBLEM — Z87.718 HISTORY OF URINARY ANOMALY: Status: ACTIVE | Noted: 2021-02-05

## 2021-02-05 PROBLEM — N95.2 ATROPHY OF VAGINA: Status: ACTIVE | Noted: 2021-02-05

## 2021-02-05 PROBLEM — Z86.19 HISTORY OF INFECTIOUS DISEASE: Status: ACTIVE | Noted: 2021-02-05

## 2021-02-05 PROBLEM — J06.9 ACUTE UPPER RESPIRATORY INFECTION: Status: ACTIVE | Noted: 2021-02-05

## 2021-02-05 PROBLEM — R60.9 EDEMA: Status: ACTIVE | Noted: 2021-02-05

## 2021-02-05 PROBLEM — M25.50 MULTIPLE JOINT PAIN: Status: ACTIVE | Noted: 2021-02-05

## 2021-02-05 PROBLEM — M19.90 UNSPECIFIED OSTEOARTHRITIS, UNSPECIFIED SITE: Status: ACTIVE | Noted: 2018-10-18

## 2021-02-05 PROBLEM — R10.2 VAGINAL PAIN: Status: ACTIVE | Noted: 2021-02-05

## 2021-02-05 PROBLEM — S43.429A SPRAIN OF ROTATOR CUFF CAPSULE: Status: ACTIVE | Noted: 2021-02-05

## 2021-02-05 PROBLEM — Z86.79 HISTORY OF HEART FAILURE: Status: ACTIVE | Noted: 2021-02-05

## 2021-02-05 PROBLEM — T14.8XXA HEMATOMA: Status: ACTIVE | Noted: 2021-02-05

## 2021-02-05 PROBLEM — E66.3 OVERWEIGHT: Status: ACTIVE | Noted: 2021-02-05

## 2021-02-05 PROBLEM — Z87.442 HISTORY OF RENAL CALCULI: Status: ACTIVE | Noted: 2021-02-05

## 2021-02-05 PROBLEM — Z86.39 HISTORY OF DIABETES MELLITUS: Status: ACTIVE | Noted: 2021-02-05

## 2021-02-05 PROBLEM — I20.9 ANGINA PECTORIS (HCC): Status: ACTIVE | Noted: 2021-02-05

## 2021-02-05 PROBLEM — L03.317 CELLULITIS OF BUTTOCK: Status: ACTIVE | Noted: 2021-02-05

## 2021-02-05 PROBLEM — B36.9 DERMAL MYCOSIS: Status: ACTIVE | Noted: 2021-02-05

## 2021-02-05 PROBLEM — R41.3 MEMORY IMPAIRMENT: Status: ACTIVE | Noted: 2021-02-05

## 2021-02-05 PROBLEM — Z87.09 HISTORY OF RESPIRATORY SYSTEM DISEASE: Status: ACTIVE | Noted: 2021-02-05

## 2021-02-05 PROBLEM — L27.2 DERMATITIS DUE TO FOOD TAKEN INTERNALLY: Status: ACTIVE | Noted: 2018-09-03

## 2021-02-09 ENCOUNTER — OFFICE VISIT (OUTPATIENT)
Dept: NEUROLOGY | Age: 86
End: 2021-02-09
Payer: MEDICARE

## 2021-02-09 VITALS
WEIGHT: 242 LBS | SYSTOLIC BLOOD PRESSURE: 130 MMHG | BODY MASS INDEX: 42.87 KG/M2 | DIASTOLIC BLOOD PRESSURE: 72 MMHG | TEMPERATURE: 98 F | HEART RATE: 92 BPM

## 2021-02-09 DIAGNOSIS — R26.89 FRONTAL GAIT: ICD-10-CM

## 2021-02-09 DIAGNOSIS — R29.6 FALLS FREQUENTLY: ICD-10-CM

## 2021-02-09 DIAGNOSIS — G47.37 CENTRAL SLEEP APNEA DUE TO MEDICAL CONDITION: ICD-10-CM

## 2021-02-09 DIAGNOSIS — F51.01 PRIMARY INSOMNIA: ICD-10-CM

## 2021-02-09 DIAGNOSIS — G31.84 MILD COGNITIVE IMPAIRMENT WITH MEMORY LOSS: Primary | ICD-10-CM

## 2021-02-09 PROCEDURE — 1090F PRES/ABSN URINE INCON ASSESS: CPT | Performed by: PSYCHIATRY & NEUROLOGY

## 2021-02-09 PROCEDURE — 1123F ACP DISCUSS/DSCN MKR DOCD: CPT | Performed by: PSYCHIATRY & NEUROLOGY

## 2021-02-09 PROCEDURE — 1036F TOBACCO NON-USER: CPT | Performed by: PSYCHIATRY & NEUROLOGY

## 2021-02-09 PROCEDURE — G8484 FLU IMMUNIZE NO ADMIN: HCPCS | Performed by: PSYCHIATRY & NEUROLOGY

## 2021-02-09 PROCEDURE — G8400 PT W/DXA NO RESULTS DOC: HCPCS | Performed by: PSYCHIATRY & NEUROLOGY

## 2021-02-09 PROCEDURE — 4040F PNEUMOC VAC/ADMIN/RCVD: CPT | Performed by: PSYCHIATRY & NEUROLOGY

## 2021-02-09 PROCEDURE — 99214 OFFICE O/P EST MOD 30 MIN: CPT | Performed by: PSYCHIATRY & NEUROLOGY

## 2021-02-09 PROCEDURE — G8427 DOCREV CUR MEDS BY ELIG CLIN: HCPCS | Performed by: PSYCHIATRY & NEUROLOGY

## 2021-02-09 PROCEDURE — G8417 CALC BMI ABV UP PARAM F/U: HCPCS | Performed by: PSYCHIATRY & NEUROLOGY

## 2021-02-09 RX ORDER — OXYCODONE HYDROCHLORIDE 20 MG/1
TABLET ORAL
COMMUNITY
Start: 2021-01-22

## 2021-02-09 RX ORDER — NEOMYCIN SULFATE, POLYMYXIN B SULFATE, BACITRACIN ZINC, HYDROCORTISONE 3.5; 10000; 400; 1 MG/G; [USP'U]/G; [USP'U]/G; MG/G
OINTMENT OPHTHALMIC
COMMUNITY
Start: 2020-11-19

## 2021-02-09 RX ORDER — MEMANTINE HYDROCHLORIDE 5 MG/1
5 TABLET ORAL 2 TIMES DAILY
Qty: 60 TABLET | Refills: 0 | Status: SHIPPED | OUTPATIENT
Start: 2021-02-09 | End: 2021-04-26 | Stop reason: SDUPTHER

## 2021-02-09 RX ORDER — ERYTHROMYCIN 5 MG/G
OINTMENT OPHTHALMIC NIGHTLY
COMMUNITY
Start: 2020-12-18

## 2021-02-09 RX ORDER — AMMONIUM LACTATE 12 G/100G
LOTION TOPICAL
COMMUNITY
Start: 2020-11-20

## 2021-02-09 RX ORDER — DONEPEZIL HYDROCHLORIDE 10 MG/1
TABLET, FILM COATED ORAL
COMMUNITY
Start: 2021-01-26

## 2021-02-09 RX ORDER — CYCLOSPORINE 0.5 MG/ML
1 EMULSION OPHTHALMIC 2 TIMES DAILY
COMMUNITY
Start: 2021-01-29 | End: 2022-08-16

## 2021-02-09 ASSESSMENT — ENCOUNTER SYMPTOMS
NAUSEA: 0
COLOR CHANGE: 0
SHORTNESS OF BREATH: 0
BACK PAIN: 1
VOMITING: 0
CHOKING: 0
TROUBLE SWALLOWING: 0
PHOTOPHOBIA: 0

## 2021-02-09 NOTE — PROGRESS NOTES
Subjective:      Patient ID: Belle Villagomez is a 82 y.o. female who presents today for:  Chief Complaint   Patient presents with    6 Month Follow-Up     Pt is only on aircept. Pt states that she is having a very hard time remebering things.  things it is due to too much medication. Short term memory is causing her problems. Pt is having a hard time with names, coming up with words, getting lost but pt is not driving.  Other     Pt had a fall back in october and she states that she hit her head and it hurt for a while and went away and now she is getting pain in her head again. She was seen by [facility] and they started that everything was okay. HPI 62-year-old right-handed female with history of mild cognitive impairment with early dementia. Patient appears to be showing some worsening patient is only on Aricept. She has sleep apnea which may be contributing to some of her performances. She is still able to perform her activities of daily living. She is on Coumadin. She walks with a cane. She has Charcot joints. She has main issues appears to be frequent falls she fell again and she hit her head. She was seen at [facility] with CT scan which was reported normal.  Patient has considerable swelling in the lower extremities with probable cellulitis as well. Patient is more concerned about her memory and between herself and her  she tries to manage what she can    No past medical history on file. No past surgical history on file.   Social History     Socioeconomic History    Marital status:      Spouse name: Not on file    Number of children: Not on file    Years of education: Not on file    Highest education level: Not on file   Occupational History    Not on file   Social Needs    Financial resource strain: Not on file    Food insecurity     Worry: Not on file     Inability: Not on file    Transportation needs     Medical: Not on file Non-medical: Not on file   Tobacco Use    Smoking status: Never Smoker    Smokeless tobacco: Never Used   Substance and Sexual Activity    Alcohol use: Not on file    Drug use: Not on file    Sexual activity: Not on file   Lifestyle    Physical activity     Days per week: Not on file     Minutes per session: Not on file    Stress: Not on file   Relationships    Social connections     Talks on phone: Not on file     Gets together: Not on file     Attends Mandaen service: Not on file     Active member of club or organization: Not on file     Attends meetings of clubs or organizations: Not on file     Relationship status: Not on file    Intimate partner violence     Fear of current or ex partner: Not on file     Emotionally abused: Not on file     Physically abused: Not on file     Forced sexual activity: Not on file   Other Topics Concern    Not on file   Social History Narrative    Not on file     No family history on file. Allergies   Allergen Reactions    Amlodipine Besylate Other (See Comments)     chest pain    Amoxicillin     Cephalexin      Other reaction(s): Unknown    Clobetasol     Codeine      Other reaction(s): GI Upset    Diltiazem     Dipivefrin     Dye [Iodides]     Iodine     Lipitor [Atorvastatin]     Lisinopril     Lotensin [Benazepril Hcl]     Lovastatin     Mobic     Morphine      Other reaction(s): GI Upset    Naproxen     Nickel     Nifedipine     Novolin 70-30 [Insulin Nph Isophane & Regular]     Sulfa Antibiotics     Tramadol     Vancomycin     Vioxx [Rofecoxib]     Cephalosporins Rash       Current Outpatient Medications   Medication Sig Dispense Refill    ammonium lactate (LAC-HYDRIN) 12 % lotion APPLY AND RUB IN A THIN FILM TO AFFECTED AREAS TWICE DAILY. (MORNING AND EVENING).       neomycin-bacitracin-polymyxin-hydrocortisone (CORTISPORIN) 1 % ophthalmic ointment Apply to eye  cycloSPORINE (RESTASIS) 0.05 % ophthalmic emulsion 1 drop 2 times daily      erythromycin (ROMYCIN) 5 MG/GM ophthalmic ointment nightly      mupirocin (BACTROBAN) 2 % ointment 15 g      donepezil (ARICEPT) 10 MG tablet       oxyCODONE (ROXICODONE) 20 MG immediate release tablet       ONETOUCH ULTRA strip TEST 4 TIMES A DAY      triamcinolone (KENALOG) 0.1 % cream Apply to lower legs twice daily and cover with moisturizer.  CPAP Machine MISC 13 cm      warfarin (COUMADIN) 2 MG tablet Take 2 mg by mouth      insulin 70-30 (NOVOLIN 70/30) (70-30) 100 UNIT per ML injection vial Inject into the skin 2 times daily      pantoprazole (PROTONIX) 40 MG tablet Take 40 mg by mouth daily      Probiotic Product (PROBIOTIC DAILY PO) Take by mouth      amitriptyline (ELAVIL) 50 MG tablet Take 50 mg by mouth nightly      fentaNYL (DURAGESIC) 75 MCG/HR Place 1 patch onto the skin every 72 hours.  potassium chloride SA (K-DUR;KLOR-CON) 20 MEQ tablet Take 20 mEq by mouth 2 times daily.  furosemide (LASIX) 40 MG tablet Take 80 mg by mouth 2 times daily       clopidogrel (PLAVIX) 75 MG tablet Take 75 mg by mouth daily.  metoprolol (TOPROL-XL) 100 MG XL tablet Take 100 mg by mouth daily.  gabapentin (NEURONTIN) 400 MG capsule Take 400 mg by mouth 3 times daily.  metolazone (ZAROXOLYN) 5 MG tablet Take 5 mg by mouth daily.  Cholecalciferol (VITAMIN D3) 1000 UNITS TABS Take  by mouth.  nitroGLYCERIN (NITROSTAT) 0.4 MG SL tablet Place 0.4 mg under the tongue every 5 minutes as needed.  insulin NPH (HUMULIN N) 100 UNIT/ML injection Inject  into the skin 2 times daily (before meals).         memantine (NAMENDA) 5 MG tablet Take 1 tablet by mouth 2 times daily (Patient not taking: Reported on 2/9/2021) 60 tablet 3    rosuvastatin (CRESTOR) 5 MG tablet       niacin (NIASPAN) 500 MG extended release tablet Take 500 mg by mouth  vitamin B-12 (CYANOCOBALAMIN) 100 MCG tablet Take 100 mcg by mouth daily      niacin (SLO-NIACIN) 500 MG extended release tablet Take 500 mg by mouth nightly      spironolactone (ALDACTONE) 25 MG tablet Take 25 mg by mouth daily      aspirin 81 MG tablet Take 81 mg by mouth daily.  Lactobacillus (ACIDOPHILUS PO) Take  by mouth. No current facility-administered medications for this visit. Review of Systems   Constitutional: Negative for fever. HENT: Negative for ear pain, tinnitus and trouble swallowing. Eyes: Negative for photophobia and visual disturbance. Respiratory: Negative for choking and shortness of breath. Cardiovascular: Negative for chest pain and palpitations. Gastrointestinal: Negative for nausea and vomiting. Musculoskeletal: Positive for arthralgias, back pain, gait problem, joint swelling and myalgias. Negative for neck pain and neck stiffness. Skin: Negative for color change. Allergic/Immunologic: Negative for food allergies. Neurological: Positive for weakness and numbness. Negative for dizziness, tremors, seizures, syncope, facial asymmetry, speech difficulty, light-headedness and headaches. Psychiatric/Behavioral: Positive for sleep disturbance. Negative for behavioral problems, confusion and hallucinations. Objective:   /72 (Site: Left Upper Arm, Position: Sitting, Cuff Size: Large Adult)   Pulse 92   Temp 98 °F (36.7 °C)   Wt 242 lb (109.8 kg)   BMI 42.87 kg/m²     Physical Exam  Vitals signs reviewed. Eyes:      Pupils: Pupils are equal, round, and reactive to light. Neck:      Musculoskeletal: Normal range of motion. Cardiovascular:      Rate and Rhythm: Normal rate and regular rhythm. Heart sounds: No murmur. Pulmonary:      Effort: Pulmonary effort is normal.      Breath sounds: Normal breath sounds. Abdominal:      General: Bowel sounds are normal.   Musculoskeletal: Normal range of motion. General: Swelling and deformity present. Right lower leg: Edema present. Left lower leg: Edema present. Skin:     General: Skin is warm. Neurological:      Mental Status: She is alert and oriented to person, place, and time. Cranial Nerves: No cranial nerve deficit. Sensory: No sensory deficit. Motor: No abnormal muscle tone. Coordination: Coordination normal.      Deep Tendon Reflexes: Babinski sign absent on the right side. Babinski sign absent on the left side. Reflex Scores:       Tricep reflexes are 1+ on the right side and 1+ on the left side. Bicep reflexes are 1+ on the right side and 1+ on the left side. Brachioradialis reflexes are 0 on the right side and 0 on the left side. Patellar reflexes are 0 on the right side and 0 on the left side. Achilles reflexes are 0 on the right side and 0 on the left side. Comments: In addition to the above patient is areflexic in the lower extremity with 1+ reflex in the upper extremities she has significant swelling and pedal edema in the lower extremity bilaterally Charcot joint. She walks with a walker. Psychiatric:         Mood and Affect: Mood normal.         No results found.     Lab Results   Component Value Date    WBC 8.3 10/23/2019    RBC 4.19 10/23/2019    HGB 13.9 10/23/2019    HCT 41.8 10/23/2019    MCV 99.7 10/23/2019    MCH 33.3 10/23/2019    MCHC 33.4 10/23/2019    RDW 13.1 10/23/2019     10/23/2019     Lab Results   Component Value Date     10/23/2019    K 4.0 10/23/2019    CL 97 10/23/2019    CO2 24 10/23/2019    BUN 16 10/23/2019    CREATININE 0.76 10/23/2019    GFRAA >60.0 10/23/2019    LABGLOM >60.0 10/23/2019    GLUCOSE 272 10/23/2019    LABALBU 2.7 04/21/2017    CALCIUM 9.9 10/23/2019    BILITOT 0.5 04/21/2017    ALKPHOS 61 04/21/2017    AST 26 04/21/2017    ALT 14 04/21/2017     Lab Results   Component Value Date    PROTIME 23.8 10/30/2019    INR 2.0 10/30/2019 No results found for: TSH, [id number], FOLATE, FERRITIN, IRON, TIBC, PTRFSAT, TSH, FREET4  No results found for: TRIG, HDL, LDLCALC, LDLDIRECT, LABVLDL  No results found for: LABAMPH, BARBSCNU, LABBENZ, CANNAB, COCAINESCRN, LABMETH, OPIATESCREENURINE, PHENCYCLIDINESCREENURINE, PPXUR, ETOH  No results found for: LITHIUM, DILFRTOT, VALPROATE    Assessment:       Diagnosis Orders   1. Mild cognitive impairment with memory loss     2. Primary insomnia     3. 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 Frontal gait     5. Falls frequently     Cognitive impairment going into early phases of dementia. Patient is only on Aricept. I recommend that we add Namenda 5 mg twice a day. There is nothing else that could be added for the same. She is still somewhat functional.  Her main issues appears to be gait issues she has a frontal gait disorder with multifactorial gait issues including swelling of her legs and possibility of neuropathy. Patient had a fall and hit her head she is on Coumadin she was seen at [facility] with a CT scan. She is not complaining of any headaches. She has insomnia. Recommended that we keep an eye on her cognitive decline and I am not quite sure what we can add. Her falls are multifactorial and she just has to be careful. Patient has 25 allergies but Namenda is not listed on this      Plan:      No orders of the defined types were placed in this encounter. No orders of the defined types were placed in this encounter. No follow-ups on file.       Corine Sanchez MD

## 2021-04-26 RX ORDER — MEMANTINE HYDROCHLORIDE 5 MG/1
5 TABLET ORAL 2 TIMES DAILY
Qty: 60 TABLET | Refills: 0 | Status: SHIPPED | OUTPATIENT
Start: 2021-04-26 | End: 2021-06-01 | Stop reason: SDUPTHER

## 2021-05-24 ENCOUNTER — VIRTUAL VISIT (OUTPATIENT)
Dept: INFECTIOUS DISEASES | Age: 86
End: 2021-05-24
Payer: MEDICARE

## 2021-05-24 DIAGNOSIS — N39.0 RECURRENT UTI: Primary | ICD-10-CM

## 2021-05-24 DIAGNOSIS — R22.43 LOCALIZED SWELLING OF BOTH LOWER LEGS: ICD-10-CM

## 2021-05-24 PROCEDURE — 99443 PR PHYS/QHP TELEPHONE EVALUATION 21-30 MIN: CPT | Performed by: INTERNAL MEDICINE

## 2021-05-24 NOTE — PROGRESS NOTES
2021    TELEHEALTH EVALUATION -- Audio/Visual (During CMQAB-43 public health emergency)      Gonzalo Masterson (:  1935) has requested an audio/video evaluation for the following concern(s):    UTI   Due to the COVID-19 outbreak, patient's visit was converted to a virtual visit. Patient agreed to proceed with a virtual visit with me via Doxy. me with my location at the office. Patient reports their location. The risks and benefits of converting to a virtual visit were discussed in light of the current infectious disease epidemic. Services were provided through an audio/video synchronous discussion virtually to substitute for in person clinic visit. THIS virtual visit was conducted via interactive/real-time audio/video. Due to this being a TeleHealth encounter, evaluation of the following organ systems is limited: Vitals/Constitutional/EENT/Resp/CV/GI//MS/Neuro/Skin/Heme-Lymph-Imm.}    Infectious Disease Progress Note       2021    Patient is a followup regarding recurrent UTI. States that she is feeling well from that standpoint - sees her urologist today. Does not feel well bc of her legs - developed some swelling after she had skin biopsies done for cancer on bilateral LE. No erythema, no chills and no fevers. Wrapping legs with ace and elevating. They \" itch\" but trying not to scratch. Subjectively, no other new complaints at this time. Lab Results   Component Value Date    WBC 8.3 10/23/2019    HGB 13.9 10/23/2019    HCT 41.8 10/23/2019    MCV 99.7 10/23/2019     10/23/2019     Lab Results   Component Value Date     10/23/2019    K 4.0 10/23/2019    CL 97 10/23/2019    CO2 24 10/23/2019    BUN 16 10/23/2019    CREATININE 0.76 10/23/2019    GLUCOSE 272 10/23/2019    CALCIUM 9.9 10/23/2019        WBC trends are being monitored. Antibiotic doses are being adjusted per most recent renal labs.        Patient Active Problem List   Diagnosis    Recurrent UTI    Neurogenic bladder    History of falling    Arteriosclerosis of coronary artery    Pain in back    Body mass index (BMI) 40.0-44.9, adult    Heart failure, unspecified (HCC)    Disorder of kidney and ureter, unspecified    Disorder of the skin and subcutaneous tissue, unspecified    Dorsalgia, unspecified    Encounter for checking and testing of cardiac pacemaker pulse generator (battery)    Presence of cardiac pacemaker    Family history of other endocrine, nutritional and metabolic diseases    Family history of ischemic heart disease and other diseases of the circulatory system    Gastric ulcer, unspecified as acute or chronic, without hemorrhage or perforation    CHF (congestive heart failure) ([address]    Deficient knowledge    Long term (current) use of insulin ([address]    Morbid obesity ([address]    Myxoid cyst    Other sleep apnea    Urine abnormality    Chronic pain    Other long term (current) drug therapy    Other specified congenital malformations    Pain in unspecified knee    Personal history of in-situ neoplasm of skin    Presence of artificial knee joint, bilateral    Pruritus, unspecified    Radiographic dye allergy status    Rash and other nonspecific skin eruption    Scoliosis, unspecified    Lumbar spinal stenosis    Spontaneous ecchymoses    Stenosis of coronary artery stent    Cardiac segmental configuration with atrial configuration unknown, ventricular configuration unknown, and inverted normally aligned great arteries    Disorder of sacrum    OA (osteoarthritis) of knee    Alternating exotropia    Anticoagulated on Coumadin    At risk for impaired skin integrity    Inflamed seborrheic keratosis    Cardiovascular disease    Closed comedone    Constipation    Current tobacco use    Dermatochalasis of eyelids of both eyes    Diabetic polyneuropathy associated with type 1 diabetes mellitus (HCC)    Diffuse photodamage of skin    Eczematous dermatitis    Epidermal cyst    Essential hypertension, benign    History of atrial fibrillation    History of Mohs micrographic surgery for skin cancer    Hypertrophic scar    Hypokalemia    Incomplete bladder emptying    Ingrown nail    Intraepithelial squamous cell carcinoma    Malignant neoplasm of skin of face    Mediastinal mass    Other and unspecified hyperlipidemia    Other malignant neoplasm of skin, site unspecified    Nonspecific abnormal results of thyroid function study    Other psoriasis    Paroxysmal atrial fibrillation (HCC)    Posterior vitreous detachment of both eyes    Postmenopausal atrophic vaginitis    Precordial pain    Pseudophakia    Pure hypercholesterolemia    SCC (squamous cell carcinoma), trunk    Seborrheic keratosis    Syncope and collapse    Type 2 diabetes mellitus without retinopathy (HCC)    Urethral stricture    Vitamin D deficiency    Memory loss    Lumbar radiculopathy    Peripheral nerve disease    Mild cognitive impairment with memory loss    Ataxic gait    Hematoma    Acute upper respiratory infection    Chronic foot ulcer (HCC)    Chronic hoarseness    Contusion of elbow    Dermal mycosis    Dermatitis due to food taken internally    Dermatitis of vulva    Disorder of vision    Edema    Foul smelling urine    Gastroparesis    H/O: gastrointestinal disease    Hemorrhoids    History of diabetes mellitus    History of heart failure    History of renal calculi    History of thrombophlebitis    Localized edema    Mild chronic obstructive pulmonary disease (HCC)    Multiple joint pain    Osteomyelitis of left foot (HCC)    Overweight    Pruritus of vulva    Reduced libido    Sprain of rotator cuff capsule    Vaginal irritation    Vaginal pain    History of back problems    H/O bronchitis    History of infectious disease    Personal history of malignant neoplasm    H/O: obesity    History of respiratory system disease    History of urinary anomaly    H/O vertigo    History of recurrent vaginal discharge    Memory impairment    Unspecified osteoarthritis, unspecified site    Knee pain    Atrophic vulva    Atrophy of vagina    Skin tear    Cellulitis of buttock    Chronic pain disorder    Angina pectoris (HCC)    Primary insomnia    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 Patient can inhale and exhale without any difficulty and chest seems to be expanding symmetrically. No conversational dyspnea. Patient does not feel any palpitations   Patient's  abdomen is not protruberant beyond their normal size. No new swelling of their joints but the legs are swollen per patient. No erythema per patient. No observed neurological changes or slurred speech when speaking to the patient    No new skin rash or ulcers        ASSESSMENT:  Recurrent UTI - stable. Sees urology today. No symptoms. Bilateral LE swelling - work up for skin cancer in progress    PLAN:  Follow up as needed. Call if there is any increase in erythema or any new fever or chills.      This was a TELEPHONE ONLY CALL     Imaging and labs were reviewed per medical records and any ID pertinent labs were also addressed  Time spent today in combination of reviewing patient's chart, medications, labs, pictures when pertinent, provider communication as necessary, counseling patient, care/coordination not otherwise reported here, and patient face to face virtual visit >25 min.   >50% of that time spent counseling and coordination of patient care  Addressed preventive measures such as vaccinations, the importance of annual exam with the PCP, and the importance of health maintenance by dietary and exercise regimens. All questions were answered from an ID perspective and to the best of my ability. Social distancing measures, the importance of face masks that properly cover the nose and mouth in public, and proper hand hygiene will continue to be addressed    Risks and benefits of ID prescribed medications were discussed with patient or supporting staff caring for the patient as appropriate and feedback obtained. Unique Bahena DO DO    Pursuant to the emergency declaration under the [address] [place] [address], [facility] waiver authority and the [employer] and [employer] Act, this Virtual Visit was conducted, with patient's consent, to reduce the patient's risk of exposure to COVID-19 and provide care for the patient.

## 2021-06-01 RX ORDER — MEMANTINE HYDROCHLORIDE 5 MG/1
5 TABLET ORAL 2 TIMES DAILY
Qty: 180 TABLET | Refills: 1 | Status: SHIPPED | OUTPATIENT
Start: 2021-06-01 | End: 2021-11-15 | Stop reason: SDUPTHER

## 2021-08-09 PROBLEM — H10.10 ALLERGIC CONJUNCTIVITIS: Status: ACTIVE | Noted: 2021-08-09

## 2021-08-09 PROBLEM — N94.9 VAGINAL BURNING: Status: ACTIVE | Noted: 2021-02-05

## 2021-08-09 PROBLEM — K21.9 GASTROESOPHAGEAL REFLUX DISEASE: Status: ACTIVE | Noted: 2021-02-05

## 2021-08-09 PROBLEM — E11.40 TYPE 2 DIABETES MELLITUS WITH DIABETIC NEUROPATHY, UNSPECIFIED (HCC): Status: ACTIVE | Noted: 2018-10-18

## 2021-08-09 PROBLEM — Z96.659 PRESENCE OF UNSPECIFIED ARTIFICIAL KNEE JOINT: Status: ACTIVE | Noted: 2018-09-03

## 2021-08-09 PROBLEM — R23.3 PETECHIAE: Status: ACTIVE | Noted: 2018-09-27

## 2021-08-09 PROBLEM — Z79.01 LONG TERM (CURRENT) USE OF ANTICOAGULANTS: Status: ACTIVE | Noted: 2019-03-12

## 2021-08-09 PROBLEM — Z88.2 ALLERGY STATUS TO SULFONAMIDES: Status: ACTIVE | Noted: 2018-09-03

## 2021-08-09 PROBLEM — A49.9 BACTERIAL INFECTION: Status: ACTIVE | Noted: 2021-08-09

## 2021-08-09 PROBLEM — Q05.9: Status: ACTIVE | Noted: 2018-10-18

## 2021-08-09 PROBLEM — R32 INCONTINENCE: Status: ACTIVE | Noted: 2021-08-09

## 2021-08-09 PROBLEM — Z87.11 HISTORY OF GASTRIC ULCER: Status: ACTIVE | Noted: 2021-08-09

## 2021-08-10 ENCOUNTER — OFFICE VISIT (OUTPATIENT)
Dept: NEUROLOGY | Age: 86
End: 2021-08-10
Payer: MEDICARE

## 2021-08-10 VITALS
SYSTOLIC BLOOD PRESSURE: 118 MMHG | HEART RATE: 87 BPM | BODY MASS INDEX: 43.33 KG/M2 | DIASTOLIC BLOOD PRESSURE: 60 MMHG | WEIGHT: 244.6 LBS

## 2021-08-10 DIAGNOSIS — E10.42 DIABETIC POLYNEUROPATHY ASSOCIATED WITH TYPE 1 DIABETES MELLITUS (HCC): ICD-10-CM

## 2021-08-10 DIAGNOSIS — R55 SYNCOPE AND COLLAPSE: ICD-10-CM

## 2021-08-10 DIAGNOSIS — M54.16 LUMBAR RADICULOPATHY: ICD-10-CM

## 2021-08-10 DIAGNOSIS — G31.84 MILD COGNITIVE IMPAIRMENT WITH MEMORY LOSS: Primary | ICD-10-CM

## 2021-08-10 DIAGNOSIS — R27.0 ATAXIA: ICD-10-CM

## 2021-08-10 PROCEDURE — 1090F PRES/ABSN URINE INCON ASSESS: CPT | Performed by: PSYCHIATRY & NEUROLOGY

## 2021-08-10 PROCEDURE — 99214 OFFICE O/P EST MOD 30 MIN: CPT | Performed by: PSYCHIATRY & NEUROLOGY

## 2021-08-10 PROCEDURE — 1036F TOBACCO NON-USER: CPT | Performed by: PSYCHIATRY & NEUROLOGY

## 2021-08-10 PROCEDURE — G8417 CALC BMI ABV UP PARAM F/U: HCPCS | Performed by: PSYCHIATRY & NEUROLOGY

## 2021-08-10 PROCEDURE — G8400 PT W/DXA NO RESULTS DOC: HCPCS | Performed by: PSYCHIATRY & NEUROLOGY

## 2021-08-10 PROCEDURE — 1123F ACP DISCUSS/DSCN MKR DOCD: CPT | Performed by: PSYCHIATRY & NEUROLOGY

## 2021-08-10 PROCEDURE — G8427 DOCREV CUR MEDS BY ELIG CLIN: HCPCS | Performed by: PSYCHIATRY & NEUROLOGY

## 2021-08-10 PROCEDURE — 4040F PNEUMOC VAC/ADMIN/RCVD: CPT | Performed by: PSYCHIATRY & NEUROLOGY

## 2021-08-10 RX ORDER — ALBUTEROL SULFATE 90 UG/1
AEROSOL, METERED RESPIRATORY (INHALATION)
COMMUNITY
Start: 2021-05-28

## 2021-08-10 ASSESSMENT — ENCOUNTER SYMPTOMS
PHOTOPHOBIA: 0
CHOKING: 0
VOMITING: 0
BACK PAIN: 1
COLOR CHANGE: 0
TROUBLE SWALLOWING: 0
SHORTNESS OF BREATH: 0
NAUSEA: 0

## 2021-08-10 NOTE — PROGRESS NOTES
Subjective:      Patient ID: Roger Amaral is a 82 y.o. female who presents today for:  Chief Complaint   Patient presents with    6 Month Follow-Up     Pt states that things are not going so good.  states that her short term memory is really bad. She states that she feels that her other illnesses are making things hard to. She states that she lost her son about year ago as well and it is still bothering her. HPI 82 right-handed female with history of cognitive impairment. Patient has primary insomnia and has sleep apnea due to medical condition patient has frontal gait disorder and has some frequent falls. Patient is on Aricept and we recommendation of Namenda 5 mg. She is still somewhat functional.  Patient has not any further falls. She still continues to decline. Patient is 25 allergies listed. She has slowly declined over time but her main issues appears to be depression after her son . She is having more trouble remembering. Occasionally she forgets that she took the medication and her  does not know. Between the 2 of them they are managing though. She is not any further medical issues. She has multiple medical issues already going on. No past medical history on file. No past surgical history on file.   Social History     Socioeconomic History    Marital status:      Spouse name: Not on file    Number of children: Not on file    Years of education: Not on file    Highest education level: Not on file   Occupational History    Not on file   Tobacco Use    Smoking status: Never Smoker    Smokeless tobacco: Never Used   Substance and Sexual Activity    Alcohol use: Not on file    Drug use: Not on file    Sexual activity: Not on file   Other Topics Concern    Not on file   Social History Narrative    Not on file     Social Determinants of Health     Financial Resource Strain:     Difficulty of Paying Living Expenses:    Food Insecurity:     Worried About Running Out of Food in the Last Year:     Suresh of Food in the Last Year:    Transportation Needs:     Lack of Transportation (Medical):  Lack of Transportation (Non-Medical):    Physical Activity:     Days of Exercise per Week:     Minutes of Exercise per Session:    Stress:     Feeling of Stress :    Social Connections:     Frequency of Communication with Friends and Family:     Frequency of Social Gatherings with Friends and Family:     Attends Mandaeism Services:     Active Member of Clubs or Organizations:     Attends Club or Organization Meetings:     Marital Status:    Intimate Partner Violence:     Fear of Current or Ex-Partner:     Emotionally Abused:     Physically Abused:     Sexually Abused:      No family history on file.   Allergies   Allergen Reactions    Amlodipine Besylate Other (See Comments)     chest pain    Amoxicillin     Cephalexin      Other reaction(s): Unknown    Clobetasol     Codeine      Other reaction(s): GI Upset    Diltiazem     Dipivefrin     Dye [Iodides]     Iodine     Lipitor [Atorvastatin]     Lisinopril     Lotensin [Benazepril Hcl]     Lovastatin     Mobic     Morphine      Other reaction(s): GI Upset    Naproxen     Nickel     Nifedipine     Novolin 70-30 [Insulin Nph Isophane & Regular]     Sulfa Antibiotics     Tramadol     Vancomycin     Vioxx [Rofecoxib]     Cephalosporins Rash       Current Outpatient Medications   Medication Sig Dispense Refill    albuterol sulfate  (90 Base) MCG/ACT inhaler       erythromycin (ROMYCIN) 5 MG/GM ophthalmic ointment nightly      donepezil (ARICEPT) 10 MG tablet       oxyCODONE (ROXICODONE) 20 MG immediate release tablet       memantine (NAMENDA) 5 MG tablet Take 1 tablet by mouth 2 times daily 60 tablet 3    ONETOUCH ULTRA strip TEST 4 TIMES A DAY      rosuvastatin (CRESTOR) 5 MG tablet       CPAP Machine MISC 13 cm      niacin (NIASPAN) 500 MG extended release tablet Take 500 mg by mouth      vitamin B-12 (CYANOCOBALAMIN) 100 MCG tablet Take 100 mcg by mouth daily      warfarin (COUMADIN) 2 MG tablet Take 2 mg by mouth      pantoprazole (PROTONIX) 40 MG tablet Take 40 mg by mouth daily      niacin (SLO-NIACIN) 500 MG extended release tablet Take 500 mg by mouth nightly      Probiotic Product (PROBIOTIC DAILY PO) Take by mouth      amitriptyline (ELAVIL) 50 MG tablet Take 50 mg by mouth nightly      spironolactone (ALDACTONE) 25 MG tablet Take 25 mg by mouth daily      potassium chloride SA (K-DUR;KLOR-CON) 20 MEQ tablet Take 20 mEq by mouth 2 times daily.  furosemide (LASIX) 40 MG tablet Take 80 mg by mouth 2 times daily       clopidogrel (PLAVIX) 75 MG tablet Take 75 mg by mouth daily.  metoprolol (TOPROL-XL) 100 MG XL tablet Take 100 mg by mouth daily.  gabapentin (NEURONTIN) 400 MG capsule Take 400 mg by mouth 3 times daily.  metolazone (ZAROXOLYN) 5 MG tablet Take 5 mg by mouth daily.  Lactobacillus (ACIDOPHILUS PO) Take  by mouth.  Cholecalciferol (VITAMIN D3) 1000 UNITS TABS Take  by mouth.  nitroGLYCERIN (NITROSTAT) 0.4 MG SL tablet Place 0.4 mg under the tongue every 5 minutes as needed.  insulin NPH (HUMULIN N) 100 UNIT/ML injection Inject  into the skin 2 times daily (before meals).  memantine (NAMENDA) 5 MG tablet Take 1 tablet by mouth 2 times daily (Patient not taking: Reported on 8/10/2021) 180 tablet 1    ammonium lactate (LAC-HYDRIN) 12 % lotion APPLY AND RUB IN A THIN FILM TO AFFECTED AREAS TWICE DAILY. (MORNING AND EVENING).  (Patient not taking: Reported on 8/10/2021)      neomycin-bacitracin-polymyxin-hydrocortisone (CORTISPORIN) 1 % ophthalmic ointment Apply to eye (Patient not taking: Reported on 8/10/2021)      cycloSPORINE (RESTASIS) 0.05 % ophthalmic emulsion 1 drop 2 times daily      mupirocin (BACTROBAN) 2 % ointment 15 g (Patient not taking: Reported on 8/10/2021)      triamcinolone (KENALOG) 0.1 % cream Apply to lower legs twice daily and cover with moisturizer. (Patient not taking: Reported on 8/10/2021)      insulin 70-30 (NOVOLIN 70/30) (70-30) 100 UNIT per ML injection vial Inject into the skin 2 times daily (Patient not taking: Reported on 8/10/2021)      fentaNYL (DURAGESIC) 75 MCG/HR Place 1 patch onto the skin every 72 hours. (Patient not taking: Reported on 8/10/2021)      aspirin 81 MG tablet Take 81 mg by mouth daily. (Patient not taking: Reported on 8/10/2021)       No current facility-administered medications for this visit. Review of Systems   Constitutional: Negative for fever. HENT: Negative for ear pain, tinnitus and trouble swallowing. Eyes: Negative for photophobia and visual disturbance. Respiratory: Negative for choking and shortness of breath. Cardiovascular: Negative for chest pain and palpitations. Gastrointestinal: Negative for nausea and vomiting. Musculoskeletal: Positive for back pain, gait problem and neck pain. Negative for joint swelling, myalgias and neck stiffness. Skin: Negative for color change. Allergic/Immunologic: Negative for food allergies. Neurological: Positive for weakness. Negative for dizziness, tremors, seizures, syncope, facial asymmetry, speech difficulty, light-headedness, numbness and headaches. Psychiatric/Behavioral: Negative for behavioral problems, confusion, hallucinations and sleep disturbance. Objective:   /60 (Site: Left Upper Arm, Position: Sitting, Cuff Size: Large Adult)   Pulse 87   Wt 244 lb 9.6 oz (110.9 kg)   BMI 43.33 kg/m²     Physical Exam  Vitals reviewed. Eyes:      Pupils: Pupils are equal, round, and reactive to light. Cardiovascular:      Rate and Rhythm: Normal rate and regular rhythm. Heart sounds: No murmur heard. Pulmonary:      Effort: Pulmonary effort is normal.      Breath sounds: Normal breath sounds.    Abdominal:      General: Bowel sounds are normal.   Musculoskeletal:         General: Normal range of motion. Cervical back: Normal range of motion. Skin:     General: Skin is warm. Neurological:      Mental Status: She is alert and oriented to person, place, and time. Cranial Nerves: No cranial nerve deficit. Sensory: No sensory deficit. Motor: No abnormal muscle tone. Coordination: Coordination normal.      Deep Tendon Reflexes: Reflexes are normal and symmetric. Babinski sign absent on the right side. Babinski sign absent on the left side. Psychiatric:         Mood and Affect: Mood normal.     Patient has 2+ pedal edema with a very large legs and she walks with a walker no parkinsonian features are noted and she is completely areflexic throughout    No results found. Lab Results   Component Value Date    WBC 8.3 10/23/2019    RBC 4.19 10/23/2019    HGB 13.9 10/23/2019    HCT 41.8 10/23/2019    MCV 99.7 10/23/2019    MCH 33.3 10/23/2019    MCHC 33.4 10/23/2019    RDW 13.1 10/23/2019     10/23/2019     Lab Results   Component Value Date     10/23/2019    K 4.0 10/23/2019    CL 97 10/23/2019    CO2 24 10/23/2019    BUN 16 10/23/2019    CREATININE 0.76 10/23/2019    GFRAA >60.0 10/23/2019    LABGLOM >60.0 10/23/2019    GLUCOSE 272 10/23/2019    LABALBU 2.7 04/21/2017    CALCIUM 9.9 10/23/2019    BILITOT 0.5 04/21/2017    ALKPHOS 61 04/21/2017    AST 26 04/21/2017    ALT 14 04/21/2017     Lab Results   Component Value Date    PROTIME 23.8 10/30/2019    INR 2.0 10/30/2019     No results found for: TSH, [id number], FOLATE, FERRITIN, IRON, TIBC, PTRFSAT, TSH, FREET4  No results found for: TRIG, HDL, LDLCALC, LDLDIRECT, LABVLDL  No results found for: LABAMPH, BARBSCNU, LABBENZ, CANNAB, COCAINESCRN, LABMETH, OPIATESCREENURINE, PHENCYCLIDINESCREENURINE, PPXUR, ETOH  No results found for: LITHIUM, DILFRTOT, VALPROATE    Assessment:       Diagnosis Orders   1. Mild cognitive impairment with memory loss     2. Lumbar radiculopathy     3. Diabetic polyneuropathy associated with type 1 diabetes mellitus ([facility] [address]     4. Syncope and collapse     5. 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 Patient has depression after her son . We had considered antidepressant though she has 27 drug allergies and I am somewhat concerned about adding anything more. We then added Namenda and she is on Aricept. She is slowly declining but not a drastic decline. She questions if the medication is helping there is no way to tell unless we do a washout and then we cannot bring her back to the same standards of is not worth doing    Patient has multiple medical issues including peripheral neuropathy gait ataxia lumbar colopathy and falls patient has not fallen of recent she was on Coumadin before she is now on Plavix. She is not any bleeding or bruising. At a functional level she is requires help and her  helps her the best he can. Between the 2 tests her wife staying with they are. Plan:      No orders of the defined types were placed in this encounter. No orders of the defined types were placed in this encounter. No follow-ups on file.       Migue Rosenthal MD

## 2021-09-08 PROBLEM — R05.9 COUGH: Status: RESOLVED | Noted: 2019-01-22 | Resolved: 2021-09-08

## 2021-11-15 RX ORDER — MEMANTINE HYDROCHLORIDE 5 MG/1
5 TABLET ORAL 2 TIMES DAILY
Qty: 180 TABLET | Refills: 1 | Status: SHIPPED | OUTPATIENT
Start: 2021-11-15 | End: 2022-06-14

## 2022-02-11 PROBLEM — K11.8 MASS OF PAROTID GLAND: Status: ACTIVE | Noted: 2022-02-11

## 2022-02-11 PROBLEM — K31.84 GASTROPARESIS: Status: ACTIVE | Noted: 2022-02-11

## 2022-02-11 PROBLEM — N39.41 URGE INCONTINENCE OF URINE: Status: ACTIVE | Noted: 2021-05-24

## 2022-02-11 PROBLEM — N31.8 FREQUENCY-URGENCY SYNDROME: Status: ACTIVE | Noted: 2021-05-24

## 2022-02-11 PROBLEM — J38.3 DISORDER OF VOCAL CORD: Status: ACTIVE | Noted: 2022-02-11

## 2022-02-11 PROBLEM — G89.4 CHRONIC PAIN SYNDROME: Status: ACTIVE | Noted: 2018-09-03

## 2022-02-15 ENCOUNTER — OFFICE VISIT (OUTPATIENT)
Dept: NEUROLOGY | Age: 87
End: 2022-02-15
Payer: MEDICARE

## 2022-02-15 DIAGNOSIS — M54.16 LUMBAR RADICULOPATHY: ICD-10-CM

## 2022-02-15 DIAGNOSIS — R27.0 ATAXIA: ICD-10-CM

## 2022-02-15 DIAGNOSIS — E10.42 DIABETIC POLYNEUROPATHY ASSOCIATED WITH TYPE 1 DIABETES MELLITUS (HCC): ICD-10-CM

## 2022-02-15 DIAGNOSIS — G31.84 MILD COGNITIVE IMPAIRMENT WITH MEMORY LOSS: Primary | ICD-10-CM

## 2022-02-15 PROCEDURE — 1090F PRES/ABSN URINE INCON ASSESS: CPT | Performed by: PSYCHIATRY & NEUROLOGY

## 2022-02-15 PROCEDURE — G8417 CALC BMI ABV UP PARAM F/U: HCPCS | Performed by: PSYCHIATRY & NEUROLOGY

## 2022-02-15 PROCEDURE — 1123F ACP DISCUSS/DSCN MKR DOCD: CPT | Performed by: PSYCHIATRY & NEUROLOGY

## 2022-02-15 PROCEDURE — G8400 PT W/DXA NO RESULTS DOC: HCPCS | Performed by: PSYCHIATRY & NEUROLOGY

## 2022-02-15 PROCEDURE — G8427 DOCREV CUR MEDS BY ELIG CLIN: HCPCS | Performed by: PSYCHIATRY & NEUROLOGY

## 2022-02-15 PROCEDURE — 99213 OFFICE O/P EST LOW 20 MIN: CPT | Performed by: PSYCHIATRY & NEUROLOGY

## 2022-02-15 PROCEDURE — G8484 FLU IMMUNIZE NO ADMIN: HCPCS | Performed by: PSYCHIATRY & NEUROLOGY

## 2022-02-15 PROCEDURE — 1036F TOBACCO NON-USER: CPT | Performed by: PSYCHIATRY & NEUROLOGY

## 2022-02-15 PROCEDURE — 4040F PNEUMOC VAC/ADMIN/RCVD: CPT | Performed by: PSYCHIATRY & NEUROLOGY

## 2022-02-15 ASSESSMENT — ENCOUNTER SYMPTOMS
TROUBLE SWALLOWING: 0
VOMITING: 0
COLOR CHANGE: 0
CHOKING: 0
SHORTNESS OF BREATH: 0
PHOTOPHOBIA: 0
BACK PAIN: 1
NAUSEA: 0

## 2022-02-15 NOTE — PROGRESS NOTES
Subjective:      Patient ID: Eric Hanna is a 82 y.o. female who presents today for:  Chief Complaint   Patient presents with    Follow-up     Pt states that her memory is not good. She states that something happened and she doesnt know what it was.  states that she has have multiple UTI's recently and he feels like this has messed with her memory more. HPI 82 right-handed female with history of mild COVID impairment. Patient has primary insomnia and a frontal gait disorder. Patient is on Aricept and we recommended Namenda 5 mg. She continue to be functional. In the interim she had two [employer] infections and shown some minor decline. This is not uncommon. It has more physical ailments than just her cognitive issues mobility is limited due to her physical ailments of her knee on the right. The injected that this did not help. She has peripheral neuropathy as well as her sugars are better. Her  takes very good care of her. She is not any falls injuries or trauma. She is not having difficulty breathing    No past medical history on file. No past surgical history on file.   Social History     Socioeconomic History    Marital status:      Spouse name: Not on file    Number of children: Not on file    Years of education: Not on file    Highest education level: Not on file   Occupational History    Not on file   Tobacco Use    Smoking status: Never Smoker    Smokeless tobacco: Never Used   Substance and Sexual Activity    Alcohol use: Not on file    Drug use: Not on file    Sexual activity: Not on file   Other Topics Concern    Not on file   Social History Narrative    Not on file     Social Determinants of Health     Financial Resource Strain:     Difficulty of Paying Living Expenses: Not on file   Food Insecurity:     Worried About Running Out of Food in the Last Year: Not on file    Suresh of Food in the Last Year: Not on file   Transportation Needs:     Lack of Transportation (Medical): Not on file    Lack of Transportation (Non-Medical): Not on file   Physical Activity:     Days of Exercise per Week: Not on file    Minutes of Exercise per Session: Not on file   Stress:     Feeling of Stress : Not on file   Social Connections:     Frequency of Communication with Friends and Family: Not on file    Frequency of Social Gatherings with Friends and Family: Not on file    Attends Scientologist Services: Not on file    Active Member of [address] [place] [address] or Organizations: Not on file    Attends Club or Organization Meetings: Not on file    Marital Status: Not on file   Intimate Partner Violence:     Fear of Current or Ex-Partner: Not on file    Emotionally Abused: Not on file    Physically Abused: Not on file    Sexually Abused: Not on file   Housing Stability:     Unable to Pay for Housing in the Last Year: Not on file    Number of [place] in the Last Year: Not on file    Unstable Housing in the Last Year: Not on file     No family history on file.   Allergies   Allergen Reactions    Amlodipine Besylate Other (See Comments)     chest pain    Amoxicillin     Cephalexin      Other reaction(s): Unknown    Clobetasol     Codeine      Other reaction(s): GI Upset    Diltiazem     Dipivefrin     Dye [Iodides]     Iodine     Lipitor [Atorvastatin]     Lisinopril     Lotensin [Benazepril Hcl]     Lovastatin     Mobic     Morphine      Other reaction(s): GI Upset    Naproxen     Nickel     Nifedipine     Novolin 70-30 [Insulin Nph Isophane & Regular]     Sulfa Antibiotics     Tramadol     Vancomycin     Vioxx [Rofecoxib]     Cephalosporins Rash       Current Outpatient Medications   Medication Sig Dispense Refill    sertraline (ZOLOFT) 50 MG tablet TAKE 1 TABLET BY MOUTH ONCE DAILY      albuterol sulfate  (90 Base) MCG/ACT inhaler       ammonium lactate (LAC-HYDRIN) 12 % lotion APPLY AND RUB IN A THIN FILM TO AFFECTED AREAS TWICE DAILY. (MORNING AND EVENING).  cycloSPORINE (RESTASIS) 0.05 % ophthalmic emulsion 1 drop 2 times daily      erythromycin (ROMYCIN) 5 MG/GM ophthalmic ointment nightly      donepezil (ARICEPT) 10 MG tablet       oxyCODONE (ROXICODONE) 20 MG immediate release tablet       ONETOUCH ULTRA strip TEST 4 TIMES A DAY      rosuvastatin (CRESTOR) 5 MG tablet       CPAP Machine MISC 13 cm      vitamin B-12 (CYANOCOBALAMIN) 100 MCG tablet Take 100 mcg by mouth daily      warfarin (COUMADIN) 2 MG tablet Take 2 mg by mouth      pantoprazole (PROTONIX) 40 MG tablet Take 40 mg by mouth daily      Probiotic Product (PROBIOTIC DAILY PO) Take by mouth      amitriptyline (ELAVIL) 50 MG tablet Take 50 mg by mouth nightly      spironolactone (ALDACTONE) 25 MG tablet Take 25 mg by mouth daily      potassium chloride SA (K-DUR;KLOR-CON) 20 MEQ tablet Take 20 mEq by mouth 2 times daily.  furosemide (LASIX) 40 MG tablet Take 80 mg by mouth 2 times daily       clopidogrel (PLAVIX) 75 MG tablet Take 75 mg by mouth daily.  metoprolol (TOPROL-XL) 100 MG XL tablet Take 100 mg by mouth daily.  gabapentin (NEURONTIN) 400 MG capsule Take 400 mg by mouth 3 times daily.  metolazone (ZAROXOLYN) 5 MG tablet Take 5 mg by mouth daily.  Lactobacillus (ACIDOPHILUS PO) Take  by mouth.  Cholecalciferol (VITAMIN D3) 1000 UNITS TABS Take  by mouth.  nitroGLYCERIN (NITROSTAT) 0.4 MG SL tablet Place 0.4 mg under the tongue every 5 minutes as needed.  insulin NPH (HUMULIN N) 100 UNIT/ML injection Inject  into the skin 2 times daily (before meals).         memantine (NAMENDA) 5 MG tablet Take 1 tablet by mouth 2 times daily 180 tablet 1    neomycin-bacitracin-polymyxin-hydrocortisone (CORTISPORIN) 1 % ophthalmic ointment Apply to eye (Patient not taking: Reported on 8/10/2021)      mupirocin (BACTROBAN) 2 % ointment 15 g (Patient not taking: Reported on 8/10/2021)      memantine (NAMENDA) 5 MG tablet Take 1 tablet by mouth 2 times daily (Patient not taking: Reported on 2/15/2022) 60 tablet 3    triamcinolone (KENALOG) 0.1 % cream Apply to lower legs twice daily and cover with moisturizer. (Patient not taking: Reported on 8/10/2021)      niacin (NIASPAN) 500 MG extended release tablet Take 500 mg by mouth (Patient not taking: Reported on 2/15/2022)      insulin 70-30 (NOVOLIN 70/30) (70-30) 100 UNIT per ML injection vial Inject into the skin 2 times daily (Patient not taking: Reported on 2/15/2022)      niacin (SLO-NIACIN) 500 MG extended release tablet Take 500 mg by mouth nightly (Patient not taking: Reported on 2/15/2022)      aspirin 81 MG tablet Take 81 mg by mouth daily. (Patient not taking: Reported on 8/10/2021)       No current facility-administered medications for this visit. Review of Systems   Constitutional: Negative for fever. HENT: Negative for ear pain, tinnitus and trouble swallowing. Eyes: Negative for photophobia and visual disturbance. Respiratory: Negative for choking and shortness of breath. Cardiovascular: Negative for chest pain and palpitations. Gastrointestinal: Negative for nausea and vomiting. Musculoskeletal: Positive for back pain and gait problem. Negative for joint swelling, myalgias, neck pain and neck stiffness. Skin: Negative for color change. Allergic/Immunologic: Negative for food allergies. Neurological: Positive for weakness and numbness. Negative for dizziness, tremors, seizures, syncope, facial asymmetry, speech difficulty, light-headedness and headaches. Psychiatric/Behavioral: Negative for behavioral problems, confusion, hallucinations and sleep disturbance. Objective: There were no vitals taken for this visit. Physical Exam  Vitals reviewed. Eyes:      Pupils: Pupils are equal, round, and reactive to light. Cardiovascular:      Rate and Rhythm: Normal rate and regular rhythm.       Heart sounds: No murmur heard.      Pulmonary:      Effort: Pulmonary effort is normal.      Breath sounds: Normal breath sounds. Abdominal:      General: Bowel sounds are normal.   Musculoskeletal:         General: Normal range of motion. Cervical back: Normal range of motion. Skin:     General: Skin is warm. Neurological:      Mental Status: She is alert and oriented to person, place, and time. Cranial Nerves: No cranial nerve deficit. Sensory: No sensory deficit. Motor: No abnormal muscle tone. Coordination: Coordination normal.      Deep Tendon Reflexes: Reflexes are normal and symmetric. Babinski sign absent on the right side. Babinski sign absent on the left side. Psychiatric:         Mood and Affect: Mood normal.     Patient has weakness in the lower extremity referral five history of the knee on the right. She is areflexic in lower extremities ataxia. No parkinsonian features are notable. No results found.     Lab Results   Component Value Date    WBC 7.8 09/27/2021    WBC 8.3 10/23/2019    RBC 4.55 09/27/2021    HGB 15.3 09/27/2021    HCT 46.8 09/27/2021     09/27/2021    MCH 33.3 10/23/2019    MCHC 32.7 09/27/2021    RDW 13.1 10/23/2019     09/27/2021     Lab Results   Component Value Date     09/27/2021    K 4.7 09/27/2021     09/27/2021    CO2 24 10/23/2019    BUN 16 10/23/2019    CREATININE 0.94 09/27/2021    GFRAA 69 09/27/2021    LABGLOM 57 09/27/2021    GLUCOSE 193 09/27/2021    LABALBU 176.6 09/27/2021    LABALBU 3.7 09/27/2021    CALCIUM 10.4 09/27/2021    BILITOT 0.5 04/21/2017    ALKPHOS 61 04/21/2017    AST 26 04/21/2017    ALT 14 04/21/2017     Lab Results   Component Value Date    PROTIME 23.8 10/30/2019    INR 2.0 10/30/2019     No results found for: TSH, [id number], FOLATE, FERRITIN, IRON, TIBC, PTRFSAT, TSH, FREET4  No results found for: TRIG, HDL, LDLCALC, LDLDIRECT, LABVLDL  No results found for: [address], [facility], [facility], [address], KIKE, Danilo Rayo, PHENCYCLIDINESCREENURINE, PPXUR, ETOH  No results found for: LITHIUM, DILFRTOT, VALPROATE    Assessment:       Diagnosis Orders   1. Mild cognitive impairment with memory loss     2. Lumbar radiculopathy     3. Diabetic polyneuropathy associated with type 1 diabetes mellitus ([facility])     Mild Cognitive  impairment , a precursor of  dementia. Patient is on Namenda 5 mg twice a day. She is still functional and her physical ailments appear to be slowing her down more than just for cognitive abilities. She had two UTIs and she is somewhat worse though she is getting better. This is likely expected with infections. I did discuss the findings and that she will strongly continue to decline though at this age she is still doing quite well. We'll keep an eye on this and continue to follow. Plan:      No orders of the defined types were placed in this encounter. No orders of the defined types were placed in this encounter. No follow-ups on file.       Delon Pleitez MD

## 2022-06-14 RX ORDER — MEMANTINE HYDROCHLORIDE 5 MG/1
TABLET ORAL
Qty: 180 TABLET | Refills: 0 | Status: SHIPPED | OUTPATIENT
Start: 2022-06-14

## 2022-08-05 PROBLEM — R39.89 BLADDER PAIN: Status: ACTIVE | Noted: 2022-01-06

## 2022-08-05 PROBLEM — E83.52 HYPERCALCEMIA: Status: ACTIVE | Noted: 2021-11-29

## 2022-08-16 ENCOUNTER — OFFICE VISIT (OUTPATIENT)
Dept: NEUROLOGY | Age: 87
End: 2022-08-16
Payer: MEDICARE

## 2022-08-16 VITALS
HEART RATE: 70 BPM | BODY MASS INDEX: 41.82 KG/M2 | DIASTOLIC BLOOD PRESSURE: 80 MMHG | SYSTOLIC BLOOD PRESSURE: 120 MMHG | WEIGHT: 236 LBS | HEIGHT: 63 IN

## 2022-08-16 DIAGNOSIS — G31.84 MCI (MILD COGNITIVE IMPAIRMENT): Primary | ICD-10-CM

## 2022-08-16 DIAGNOSIS — M54.50 CHRONIC BILATERAL LOW BACK PAIN WITHOUT SCIATICA: ICD-10-CM

## 2022-08-16 DIAGNOSIS — M54.16 LUMBAR RADICULOPATHY: ICD-10-CM

## 2022-08-16 DIAGNOSIS — G89.29 CHRONIC BILATERAL LOW BACK PAIN WITHOUT SCIATICA: ICD-10-CM

## 2022-08-16 DIAGNOSIS — G47.33 OBSTRUCTIVE SLEEP APNEA SYNDROME: ICD-10-CM

## 2022-08-16 DIAGNOSIS — R41.3 MEMORY LOSS: ICD-10-CM

## 2022-08-16 DIAGNOSIS — R26.0 ATAXIC GAIT: ICD-10-CM

## 2022-08-16 DIAGNOSIS — E10.42 DIABETIC POLYNEUROPATHY ASSOCIATED WITH TYPE 1 DIABETES MELLITUS (HCC): ICD-10-CM

## 2022-08-16 PROBLEM — N20.0 NEPHROLITHIASIS: Status: ACTIVE | Noted: 2022-07-18

## 2022-08-16 PROCEDURE — 99214 OFFICE O/P EST MOD 30 MIN: CPT | Performed by: PSYCHIATRY & NEUROLOGY

## 2022-08-16 PROCEDURE — G8400 PT W/DXA NO RESULTS DOC: HCPCS | Performed by: PSYCHIATRY & NEUROLOGY

## 2022-08-16 PROCEDURE — 1123F ACP DISCUSS/DSCN MKR DOCD: CPT | Performed by: PSYCHIATRY & NEUROLOGY

## 2022-08-16 PROCEDURE — 1036F TOBACCO NON-USER: CPT | Performed by: PSYCHIATRY & NEUROLOGY

## 2022-08-16 PROCEDURE — G8427 DOCREV CUR MEDS BY ELIG CLIN: HCPCS | Performed by: PSYCHIATRY & NEUROLOGY

## 2022-08-16 PROCEDURE — 1090F PRES/ABSN URINE INCON ASSESS: CPT | Performed by: PSYCHIATRY & NEUROLOGY

## 2022-08-16 PROCEDURE — G8417 CALC BMI ABV UP PARAM F/U: HCPCS | Performed by: PSYCHIATRY & NEUROLOGY

## 2022-08-16 RX ORDER — FUROSEMIDE 80 MG
TABLET ORAL
COMMUNITY
Start: 2022-06-24

## 2022-08-16 ASSESSMENT — ENCOUNTER SYMPTOMS
COLOR CHANGE: 0
PHOTOPHOBIA: 0
SHORTNESS OF BREATH: 0
NAUSEA: 0
VOMITING: 0
BACK PAIN: 1
TROUBLE SWALLOWING: 0
CHOKING: 0

## 2022-08-16 NOTE — PROGRESS NOTES
Subjective:      Patient ID: Alonzo Malcolm is a 82 y.o. female who presents today for:  Chief Complaint   Patient presents with    6 Month Follow-Up     Mild cognitive impairment , patient states she's doing ok, no concerns at this time. HPI 82 right-handed female with a history of cognitive impairment. Patient was diagnosed by COVID impairment few years ago and has been doing well without any major changes. She still remains quite independent she is on Namenda. She also is on Aricept this does not appear to be on her list.  She denies any side effects of the medication. She has not any recent hospitalizations patient has other complicated history of gait issues visual dysfunction neuropathy back pain gait ataxia and sleep apnea which actually contributes to her decreased level of functioning more than just her memory. She is becoming somewhat more forgetful though between herself and her  that manage well at home    History reviewed. No pertinent past medical history. History reviewed. No pertinent surgical history. Social History     Socioeconomic History    Marital status:      Spouse name: Not on file    Number of children: Not on file    Years of education: Not on file    Highest education level: Not on file   Occupational History    Not on file   Tobacco Use    Smoking status: Never    Smokeless tobacco: Never   Substance and Sexual Activity    Alcohol use: Not on file    Drug use: Not on file    Sexual activity: Not on file   Other Topics Concern    Not on file   Social History Narrative    Not on file     Social Determinants of Health     Financial Resource Strain: Not on file   Food Insecurity: Not on file   Transportation Needs: Not on file   Physical Activity: Not on file   Stress: Not on file   Social Connections: Not on file   Intimate Partner Violence: Not on file   Housing Stability: Not on file     History reviewed. No pertinent family history.   Allergies   Allergen Reactions    Amlodipine Besylate Other (See Comments)     chest pain    Amoxicillin     Cephalexin      Other reaction(s): Unknown    Clobetasol     Codeine      Other reaction(s): GI Upset    Diltiazem     Dipivefrin     Dye [Iodides]     Iodine     Lipitor [Atorvastatin]     Lisinopril     Lotensin [Benazepril Hcl]     Lovastatin     Mobic     Morphine      Other reaction(s): GI Upset    Naproxen     Nickel     Nifedipine     Novolin 70-30 [Insulin Nph Isophane & Regular]     Sulfa Antibiotics     Tramadol     Vancomycin     Vioxx [Rofecoxib]     Cephalosporins Rash       Current Outpatient Medications   Medication Sig Dispense Refill    furosemide (LASIX) 80 MG tablet TAKE 1 TABLET BY MOUTH ONCE DAILY      memantine (NAMENDA) 5 MG tablet Take 1 tablet by mouth twice daily 180 tablet 0    sertraline (ZOLOFT) 50 MG tablet TAKE 1 TABLET BY MOUTH ONCE DAILY      albuterol sulfate  (90 Base) MCG/ACT inhaler       ammonium lactate (LAC-HYDRIN) 12 % lotion APPLY AND RUB IN A THIN FILM TO AFFECTED AREAS TWICE DAILY. (MORNING AND EVENING). neomycin-bacitracin-polymyxin-hydrocortisone (CORTISPORIN) 1 % ophthalmic ointment Apply to eye      cycloSPORINE (RESTASIS) 0.05 % ophthalmic emulsion 1 drop 2 times daily      erythromycin (ROMYCIN) 5 MG/GM ophthalmic ointment nightly      mupirocin (BACTROBAN) 2 % ointment 15 g      donepezil (ARICEPT) 10 MG tablet       oxyCODONE (ROXICODONE) 20 MG immediate release tablet       memantine (NAMENDA) 5 MG tablet Take 1 tablet by mouth 2 times daily 60 tablet 3    ONETOUCH ULTRA strip TEST 4 TIMES A DAY      rosuvastatin (CRESTOR) 5 MG tablet       triamcinolone (KENALOG) 0.1 % cream Apply to lower legs twice daily and cover with moisturizer.       CPAP Machine MISC 13 cm      niacin (NIASPAN) 500 MG extended release tablet Take 500 mg by mouth      vitamin B-12 (CYANOCOBALAMIN) 100 MCG tablet Take 100 mcg by mouth daily      warfarin (COUMADIN) 2 MG tablet Take 2 mg by mouth      insulin 70-30 (HUMULIN;NOVOLIN) (70-30) 100 UNIT per ML injection vial Inject into the skin 2 times daily      pantoprazole (PROTONIX) 40 MG tablet Take 40 mg by mouth daily      niacin (SLO-NIACIN) 500 MG extended release tablet Take 500 mg by mouth nightly      Probiotic Product (PROBIOTIC DAILY PO) Take by mouth      amitriptyline (ELAVIL) 50 MG tablet Take 50 mg by mouth nightly      spironolactone (ALDACTONE) 25 MG tablet Take 25 mg by mouth daily      potassium chloride SA (K-DUR;KLOR-CON) 20 MEQ tablet Take 20 mEq by mouth 2 times daily. clopidogrel (PLAVIX) 75 MG tablet Take 75 mg by mouth daily. metoprolol (TOPROL-XL) 100 MG XL tablet Take 100 mg by mouth daily. gabapentin (NEURONTIN) 400 MG capsule Take 400 mg by mouth 3 times daily. metolazone (ZAROXOLYN) 5 MG tablet Take 5 mg by mouth daily. Lactobacillus (ACIDOPHILUS PO) Take  by mouth. Cholecalciferol (VITAMIN D3) 1000 UNITS TABS Take  by mouth. nitroGLYCERIN (NITROSTAT) 0.4 MG SL tablet Place 0.4 mg under the tongue every 5 minutes as needed. insulin NPH (HUMULIN N;NOVOLIN N) 100 UNIT/ML injection vial Inject  into the skin 2 times daily (before meals). aspirin 81 MG tablet Take 81 mg by mouth daily. (Patient not taking: No sig reported)       No current facility-administered medications for this visit. Review of Systems   Constitutional:  Negative for fever. HENT:  Negative for ear pain, tinnitus and trouble swallowing. Eyes:  Positive for visual disturbance. Negative for photophobia. Respiratory:  Negative for choking and shortness of breath. Cardiovascular:  Negative for chest pain and palpitations. Gastrointestinal:  Negative for nausea and vomiting. Musculoskeletal:  Positive for arthralgias, back pain, gait problem and myalgias. Negative for joint swelling, neck pain and neck stiffness. Skin:  Negative for color change.    Allergic/Immunologic: Negative for food allergies. Neurological:  Negative for dizziness, tremors, seizures, syncope, facial asymmetry, speech difficulty, weakness, light-headedness, numbness and headaches. Psychiatric/Behavioral:  Negative for behavioral problems, confusion, hallucinations and sleep disturbance. Objective:   /80 (Site: Left Upper Arm, Position: Sitting, Cuff Size: Medium Adult)   Pulse 70   Ht 5' 3\" (1.6 m)   Wt 236 lb (107 kg)   BMI 41.81 kg/m²     Physical Exam  Vitals reviewed. Eyes:      Pupils: Pupils are equal, round, and reactive to light. Cardiovascular:      Rate and Rhythm: Normal rate and regular rhythm. Heart sounds: No murmur heard. Pulmonary:      Effort: Pulmonary effort is normal.      Breath sounds: Normal breath sounds. Abdominal:      General: Bowel sounds are normal.   Musculoskeletal:         General: Normal range of motion. Cervical back: Normal range of motion. Skin:     General: Skin is warm. Neurological:      Mental Status: She is alert and oriented to person, place, and time. Cranial Nerves: No cranial nerve deficit. Sensory: No sensory deficit. Motor: Weakness present. No abnormal muscle tone. Coordination: Coordination normal.      Gait: Gait abnormal.      Deep Tendon Reflexes: Reflexes abnormal. Babinski sign absent on the right side. Babinski sign absent on the left side. Comments: Patient is a slow gait and stooped gait no parkinsonian features are noted there is no tremors. She is completely areflexic in the lower extremity and 1+ reflex in the upper extremity with second visual dysfunction as well. Psychiatric:         Mood and Affect: Mood normal.       No results found.     Lab Results   Component Value Date/Time    WBC 7.8 09/27/2021 11:06 AM    WBC 8.3 10/23/2019 09:50 AM    RBC 4.55 09/27/2021 11:06 AM    HGB 15.3 09/27/2021 11:06 AM    HCT 46.8 09/27/2021 11:06 AM     09/27/2021 11:06 AM    MCH 33.3 10/23/2019 09:50 AM    MCHC 32.7 09/27/2021 11:06 AM    RDW 13.1 10/23/2019 09:50 AM     09/27/2021 11:06 AM     Lab Results   Component Value Date/Time     09/27/2021 11:06 AM    K 4.7 09/27/2021 11:06 AM     09/27/2021 11:06 AM    CO2 24 10/23/2019 09:50 AM    BUN 16 10/23/2019 09:50 AM    CREATININE 0.94 09/27/2021 11:06 AM    GFRAA 69 09/27/2021 11:06 AM    LABGLOM 57 09/27/2021 11:06 AM    GLUCOSE 193 09/27/2021 11:06 AM    LABALBU 176.6 09/27/2021 11:06 AM    LABALBU 3.7 09/27/2021 11:06 AM    CALCIUM 10.4 09/27/2021 11:06 AM    BILITOT 0.5 04/21/2017 12:00 AM    ALKPHOS 61 04/21/2017 12:00 AM    AST 26 04/21/2017 12:00 AM    ALT 14 04/21/2017 12:00 AM     Lab Results   Component Value Date/Time    PROTIME 23.8 10/30/2019 08:55 AM    INR 2.0 10/30/2019 08:55 AM     No results found for: TSH, [id number], FOLATE, FERRITIN, IRON, TIBC, PTRFSAT, RETICCOUNT, TSH, FREET4  No results found for: TRIG, HDL, LDLCALC, LDLDIRECT, LABVLDL  No results found for: LABAMPH, BARBSCNU, LABBENZ, CANNAB, COCAINESCRN, LABMETH, OPIATESCREENURINE, PHENCYCLIDINESCREENURINE, PPXUR, ETOH  No results found for: LITHIUM, DILFRTOT, VALPROATE    Assessment:       Diagnosis Orders   1. MCI (mild cognitive impairment)        2. Diabetic polyneuropathy associated with type 1 diabetes mellitus ([address]        3. Lumbar radiculopathy        4. Chronic bilateral low back pain without sciatica        5. Ataxic gait        6. Memory loss        7. Obstructive sleep apnea syndrome        Mild cognitive impairment with slow worsening. Patient is supposed to be on Namenda and Aricept which is on the list though she is not quite sure and will have to have the medication bottles at some point in time. Patient has not declined in terms of her memory but physically she has declined considerably due to multiple medical issues which include her neuropathy back pain visual dysfunction and her sleep apnea. She uses a CPAP machine.   She denies any strokelike symptoms. She still functions though much limited physically due to other ailments than just her memory issues. We will keep her on the same medication for now and continue to follow. Plan:      No orders of the defined types were placed in this encounter. No orders of the defined types were placed in this encounter. No follow-ups on file.       Margarette Hargrove MD

## 2022-09-12 RX ORDER — MEMANTINE HYDROCHLORIDE 5 MG/1
5 TABLET ORAL 2 TIMES DAILY
Qty: 60 TABLET | Refills: 3 | Status: SHIPPED | OUTPATIENT
Start: 2022-09-12

## 2022-09-12 NOTE — TELEPHONE ENCOUNTER
Patients spouse is requesting medication refill.  Please approve or deny this request.    Rx requested:  Requested Prescriptions      No prescriptions requested or ordered in this encounter         Last Office Visit:   8/16/2022      Next Visit Date:  Future Appointments   Date Time Provider Justine Hutchison   2/14/2023  1:45 PM Clive Renteria MD [address] [place] [address]

## 2023-01-01 ENCOUNTER — APPOINTMENT (OUTPATIENT)
Dept: PRIMARY CARE | Facility: CLINIC | Age: 88
End: 2023-01-01
Payer: MEDICARE

## 2023-01-01 ENCOUNTER — PATIENT OUTREACH (OUTPATIENT)
Dept: PRIMARY CARE | Facility: CLINIC | Age: 88
End: 2023-01-01
Payer: MEDICARE

## 2023-01-01 ENCOUNTER — DOCUMENTATION (OUTPATIENT)
Dept: PRIMARY CARE | Facility: CLINIC | Age: 88
End: 2023-01-01
Payer: MEDICARE

## 2023-01-01 ENCOUNTER — OFFICE VISIT (OUTPATIENT)
Dept: PRIMARY CARE | Facility: CLINIC | Age: 88
End: 2023-01-01
Payer: MEDICARE

## 2023-01-01 ENCOUNTER — TELEPHONE (OUTPATIENT)
Dept: PRIMARY CARE | Facility: CLINIC | Age: 88
End: 2023-01-01

## 2023-01-01 ENCOUNTER — OUTSIDE SERVICES (OUTPATIENT)
Dept: INFECTIOUS DISEASES | Age: 88
End: 2023-01-01
Payer: MEDICARE

## 2023-01-01 ENCOUNTER — LAB (OUTPATIENT)
Dept: LAB | Facility: LAB | Age: 88
End: 2023-01-01
Payer: MEDICARE

## 2023-01-01 ENCOUNTER — OUTSIDE SERVICES (OUTPATIENT)
Dept: INFECTIOUS DISEASES | Age: 88
End: 2023-01-01

## 2023-01-01 ENCOUNTER — PATIENT OUTREACH (OUTPATIENT)
Dept: PRIMARY CARE | Facility: CLINIC | Age: 88
End: 2023-01-01

## 2023-01-01 ENCOUNTER — DOCUMENTATION (OUTPATIENT)
Dept: PRIMARY CARE | Facility: CLINIC | Age: 88
End: 2023-01-01

## 2023-01-01 ENCOUNTER — TELEPHONE (OUTPATIENT)
Dept: PRIMARY CARE | Facility: CLINIC | Age: 88
End: 2023-01-01
Payer: MEDICARE

## 2023-01-01 ENCOUNTER — NURSING HOME VISIT (OUTPATIENT)
Dept: POST ACUTE CARE | Facility: EXTERNAL LOCATION | Age: 88
End: 2023-01-01
Payer: MEDICARE

## 2023-01-01 VITALS — HEART RATE: 88 BPM | SYSTOLIC BLOOD PRESSURE: 110 MMHG | DIASTOLIC BLOOD PRESSURE: 67 MMHG

## 2023-01-01 VITALS
OXYGEN SATURATION: 98 % | TEMPERATURE: 98 F | HEART RATE: 77 BPM | SYSTOLIC BLOOD PRESSURE: 111 MMHG | WEIGHT: 233 LBS | DIASTOLIC BLOOD PRESSURE: 87 MMHG | RESPIRATION RATE: 18 BRPM | HEIGHT: 66 IN | BODY MASS INDEX: 37.45 KG/M2

## 2023-01-01 VITALS
SYSTOLIC BLOOD PRESSURE: 112 MMHG | DIASTOLIC BLOOD PRESSURE: 62 MMHG | BODY MASS INDEX: 44.16 KG/M2 | HEIGHT: 62 IN | WEIGHT: 240 LBS

## 2023-01-01 VITALS
BODY MASS INDEX: 39.99 KG/M2 | DIASTOLIC BLOOD PRESSURE: 60 MMHG | SYSTOLIC BLOOD PRESSURE: 97 MMHG | HEIGHT: 65 IN | WEIGHT: 240 LBS

## 2023-01-01 VITALS
HEART RATE: 90 BPM | WEIGHT: 233 LBS | DIASTOLIC BLOOD PRESSURE: 86 MMHG | SYSTOLIC BLOOD PRESSURE: 133 MMHG | BODY MASS INDEX: 42.62 KG/M2

## 2023-01-01 VITALS — WEIGHT: 237 LBS | BODY MASS INDEX: 43.35 KG/M2

## 2023-01-01 VITALS — HEART RATE: 100 BPM | DIASTOLIC BLOOD PRESSURE: 56 MMHG | SYSTOLIC BLOOD PRESSURE: 100 MMHG

## 2023-01-01 VITALS — DIASTOLIC BLOOD PRESSURE: 68 MMHG | HEART RATE: 88 BPM | SYSTOLIC BLOOD PRESSURE: 110 MMHG

## 2023-01-01 VITALS
BODY MASS INDEX: 34.54 KG/M2 | WEIGHT: 214 LBS | SYSTOLIC BLOOD PRESSURE: 114 MMHG | DIASTOLIC BLOOD PRESSURE: 70 MMHG | HEART RATE: 89 BPM

## 2023-01-01 VITALS — SYSTOLIC BLOOD PRESSURE: 134 MMHG | DIASTOLIC BLOOD PRESSURE: 87 MMHG | HEART RATE: 88 BPM

## 2023-01-01 VITALS — DIASTOLIC BLOOD PRESSURE: 70 MMHG | SYSTOLIC BLOOD PRESSURE: 95 MMHG | HEART RATE: 98 BPM

## 2023-01-01 VITALS — SYSTOLIC BLOOD PRESSURE: 100 MMHG | DIASTOLIC BLOOD PRESSURE: 67 MMHG | HEART RATE: 98 BPM

## 2023-01-01 VITALS — SYSTOLIC BLOOD PRESSURE: 98 MMHG | HEART RATE: 88 BPM | DIASTOLIC BLOOD PRESSURE: 68 MMHG

## 2023-01-01 DIAGNOSIS — F03.90 DEMENTIA WITHOUT BEHAVIORAL DISTURBANCE, PSYCHOTIC DISTURBANCE, MOOD DISTURBANCE, OR ANXIETY, UNSPECIFIED DEMENTIA SEVERITY, UNSPECIFIED DEMENTIA TYPE (MULTI): ICD-10-CM

## 2023-01-01 DIAGNOSIS — G61.81 CHRONIC INFLAMMATORY DEMYELINATING POLYNEUROPATHY (MULTI): ICD-10-CM

## 2023-01-01 DIAGNOSIS — C80.1 MALIGNANT NEOPLASM (MULTI): ICD-10-CM

## 2023-01-01 DIAGNOSIS — E11.9 DIABETES MELLITUS TYPE 2, INSULIN DEPENDENT (MULTI): ICD-10-CM

## 2023-01-01 DIAGNOSIS — Z79.4 TYPE 2 DIABETES MELLITUS WITH DIABETIC PERIPHERAL ANGIOPATHY WITHOUT GANGRENE, WITH LONG-TERM CURRENT USE OF INSULIN (MULTI): ICD-10-CM

## 2023-01-01 DIAGNOSIS — R30.9 PAIN WITH URINATION: ICD-10-CM

## 2023-01-01 DIAGNOSIS — I10 BENIGN ESSENTIAL HYPERTENSION: ICD-10-CM

## 2023-01-01 DIAGNOSIS — Z79.4 DIABETES MELLITUS TYPE 2, INSULIN DEPENDENT (MULTI): ICD-10-CM

## 2023-01-01 DIAGNOSIS — R13.12 OROPHARYNGEAL DYSPHAGIA: ICD-10-CM

## 2023-01-01 DIAGNOSIS — Z74.09 IMPAIRED FUNCTIONAL MOBILITY, BALANCE, GAIT, AND ENDURANCE: ICD-10-CM

## 2023-01-01 DIAGNOSIS — J18.9 PNEUMONIA OF BOTH LUNGS DUE TO INFECTIOUS ORGANISM, UNSPECIFIED PART OF LUNG: ICD-10-CM

## 2023-01-01 DIAGNOSIS — I50.32 CHRONIC DIASTOLIC CONGESTIVE HEART FAILURE, NYHA CLASS 3 (MULTI): ICD-10-CM

## 2023-01-01 DIAGNOSIS — J44.9 CHRONIC OBSTRUCTIVE PULMONARY DISEASE, UNSPECIFIED COPD TYPE (MULTI): ICD-10-CM

## 2023-01-01 DIAGNOSIS — G47.33 OBSTRUCTIVE SLEEP APNEA, ADULT: ICD-10-CM

## 2023-01-01 DIAGNOSIS — L97.511 CHRONIC ULCER OF RIGHT FOOT LIMITED TO BREAKDOWN OF SKIN (MULTI): Primary | ICD-10-CM

## 2023-01-01 DIAGNOSIS — E11.65 TYPE 2 DIABETES MELLITUS WITH HYPERGLYCEMIA, UNSPECIFIED WHETHER LONG TERM INSULIN USE (MULTI): ICD-10-CM

## 2023-01-01 DIAGNOSIS — G93.41 ACUTE METABOLIC ENCEPHALOPATHY: Primary | ICD-10-CM

## 2023-01-01 DIAGNOSIS — J69.0 ASPIRATION PNEUMONIA OF BOTH LOWER LOBES DUE TO GASTRIC SECRETIONS (HCC): ICD-10-CM

## 2023-01-01 DIAGNOSIS — Z95.0 CARDIAC PACEMAKER IN SITU: ICD-10-CM

## 2023-01-01 DIAGNOSIS — R52 PAIN: ICD-10-CM

## 2023-01-01 DIAGNOSIS — N18.31 STAGE 3A CHRONIC KIDNEY DISEASE (MULTI): ICD-10-CM

## 2023-01-01 DIAGNOSIS — F41.9 ANXIETY AND DEPRESSION: ICD-10-CM

## 2023-01-01 DIAGNOSIS — I10 HYPERTENSION, ESSENTIAL: ICD-10-CM

## 2023-01-01 DIAGNOSIS — J69.0 ASPIRATION PNEUMONIA, UNSPECIFIED ASPIRATION PNEUMONIA TYPE, UNSPECIFIED LATERALITY, UNSPECIFIED PART OF LUNG (MULTI): Primary | ICD-10-CM

## 2023-01-01 DIAGNOSIS — I10 HYPERTENSION, UNSPECIFIED TYPE: ICD-10-CM

## 2023-01-01 DIAGNOSIS — K21.9 GASTROESOPHAGEAL REFLUX DISEASE WITHOUT ESOPHAGITIS: ICD-10-CM

## 2023-01-01 DIAGNOSIS — E11.51 TYPE 2 DIABETES MELLITUS WITH DIABETIC PERIPHERAL ANGIOPATHY WITHOUT GANGRENE, WITH LONG-TERM CURRENT USE OF INSULIN (MULTI): ICD-10-CM

## 2023-01-01 DIAGNOSIS — M19.93 OTHER SECONDARY OSTEOARTHRITIS, UNSPECIFIED SITE: Primary | ICD-10-CM

## 2023-01-01 DIAGNOSIS — N30.00 ACUTE CYSTITIS WITHOUT HEMATURIA: Primary | ICD-10-CM

## 2023-01-01 DIAGNOSIS — G89.4 CHRONIC PAIN SYNDROME: Primary | ICD-10-CM

## 2023-01-01 DIAGNOSIS — E66.01 MORBID (SEVERE) OBESITY DUE TO EXCESS CALORIES (MULTI): ICD-10-CM

## 2023-01-01 DIAGNOSIS — R06.02 SOB (SHORTNESS OF BREATH): ICD-10-CM

## 2023-01-01 DIAGNOSIS — F32.A ANXIETY AND DEPRESSION: ICD-10-CM

## 2023-01-01 DIAGNOSIS — R45.1 RESTLESSNESS: ICD-10-CM

## 2023-01-01 DIAGNOSIS — E66.01 OBESITY, CLASS III, BMI 40-49.9 (MORBID OBESITY) (MULTI): ICD-10-CM

## 2023-01-01 DIAGNOSIS — I25.118 CORONARY ARTERY DISEASE INVOLVING NATIVE CORONARY ARTERY OF NATIVE HEART WITH OTHER FORM OF ANGINA PECTORIS (CMS-HCC): ICD-10-CM

## 2023-01-01 DIAGNOSIS — G93.41 ACUTE METABOLIC ENCEPHALOPATHY: ICD-10-CM

## 2023-01-01 DIAGNOSIS — J69.0 ASPIRATION PNEUMONIA OF BOTH LOWER LOBES, UNSPECIFIED ASPIRATION PNEUMONIA TYPE (HCC): ICD-10-CM

## 2023-01-01 DIAGNOSIS — J18.9 PNEUMONIA OF BOTH LUNGS DUE TO INFECTIOUS ORGANISM, UNSPECIFIED PART OF LUNG: Primary | ICD-10-CM

## 2023-01-01 DIAGNOSIS — I48.91 ATRIAL FIBRILLATION, UNSPECIFIED TYPE (MULTI): ICD-10-CM

## 2023-01-01 DIAGNOSIS — K92.2 GASTROINTESTINAL HEMORRHAGE, UNSPECIFIED GASTROINTESTINAL HEMORRHAGE TYPE: ICD-10-CM

## 2023-01-01 DIAGNOSIS — R30.9 URINARY PAIN: Primary | ICD-10-CM

## 2023-01-01 DIAGNOSIS — J69.0 ASPIRATION PNEUMONIA OF BOTH LOWER LOBES, UNSPECIFIED ASPIRATION PNEUMONIA TYPE (HCC): Primary | ICD-10-CM

## 2023-01-01 DIAGNOSIS — G89.29 OTHER CHRONIC PAIN: Primary | ICD-10-CM

## 2023-01-01 DIAGNOSIS — E11.69 TYPE 2 DIABETES MELLITUS WITH OTHER SPECIFIED COMPLICATION, UNSPECIFIED WHETHER LONG TERM INSULIN USE (MULTI): ICD-10-CM

## 2023-01-01 DIAGNOSIS — R41.3 MEMORY IMPAIRMENT: ICD-10-CM

## 2023-01-01 DIAGNOSIS — N30.91 HEMORRHAGIC CYSTITIS: ICD-10-CM

## 2023-01-01 DIAGNOSIS — R10.2 VAGINAL PAIN: ICD-10-CM

## 2023-01-01 DIAGNOSIS — I25.10 CORONARY ARTERY DISEASE INVOLVING NATIVE CORONARY ARTERY OF NATIVE HEART, UNSPECIFIED WHETHER ANGINA PRESENT: ICD-10-CM

## 2023-01-01 DIAGNOSIS — R10.2 PELVIC PAIN: Primary | ICD-10-CM

## 2023-01-01 DIAGNOSIS — K92.2 GASTROINTESTINAL HEMORRHAGE, UNSPECIFIED GASTROINTESTINAL HEMORRHAGE TYPE: Primary | ICD-10-CM

## 2023-01-01 DIAGNOSIS — R30.0 DYSURIA: Primary | ICD-10-CM

## 2023-01-01 DIAGNOSIS — M19.93 OTHER SECONDARY OSTEOARTHRITIS, UNSPECIFIED SITE: ICD-10-CM

## 2023-01-01 DIAGNOSIS — N39.0 RECURRENT URINARY TRACT INFECTION: ICD-10-CM

## 2023-01-01 DIAGNOSIS — N30.21 HEMATURIA DUE TO CHRONIC CYSTITIS: ICD-10-CM

## 2023-01-01 DIAGNOSIS — N30.00 ACUTE CYSTITIS WITHOUT HEMATURIA: ICD-10-CM

## 2023-01-01 DIAGNOSIS — I20.1 PRINZMETAL'S ANGINA (CMS-HCC): ICD-10-CM

## 2023-01-01 DIAGNOSIS — N93.9 VAGINAL BLEEDING: Primary | ICD-10-CM

## 2023-01-01 DIAGNOSIS — R91.1 PULMONARY NODULE: ICD-10-CM

## 2023-01-01 DIAGNOSIS — M25.562 LEFT KNEE PAIN, UNSPECIFIED CHRONICITY: Primary | ICD-10-CM

## 2023-01-01 DIAGNOSIS — Q05.7 SPINA BIFIDA OF LUMBAR REGION, UNSPECIFIED HYDROCEPHALUS PRESENCE (MULTI): ICD-10-CM

## 2023-01-01 DIAGNOSIS — E44.0 MODERATE PROTEIN MALNUTRITION (MULTI): ICD-10-CM

## 2023-01-01 DIAGNOSIS — E78.2 MODERATE MIXED HYPERLIPIDEMIA NOT REQUIRING STATIN THERAPY: ICD-10-CM

## 2023-01-01 DIAGNOSIS — I20.9 ANGINA PECTORIS (CMS-HCC): ICD-10-CM

## 2023-01-01 DIAGNOSIS — R06.02 SHORTNESS OF BREATH: ICD-10-CM

## 2023-01-01 DIAGNOSIS — R30.9 URINARY PAIN: ICD-10-CM

## 2023-01-01 DIAGNOSIS — M86.9 OSTEOMYELITIS OF LEFT FOOT, UNSPECIFIED TYPE (MULTI): ICD-10-CM

## 2023-01-01 DIAGNOSIS — I48.0 PAROXYSMAL ATRIAL FIBRILLATION (MULTI): ICD-10-CM

## 2023-01-01 DIAGNOSIS — R29.6 MULTIPLE FALLS: ICD-10-CM

## 2023-01-01 LAB
BACTERIA, URINE: ABNORMAL /HPF
CLUE CELLS: ABNORMAL
HYALINE CASTS, URINE: ABNORMAL /LPF
HYALINE CASTS, URINE: ABNORMAL /LPF
INR IN PPP BY COAGULATION ASSAY: 1.3 (ref 0.9–1.1)
INR IN PPP BY COAGULATION ASSAY: 1.7 (ref 0.9–1.1)
INR IN PPP BY COAGULATION ASSAY: 2.4 (ref 0.9–1.1)
INR IN PPP BY COAGULATION ASSAY: 2.5 (ref 0.9–1.1)
INR IN PPP BY COAGULATION ASSAY: 3.9 (ref 0.9–1.1)
INR IN PPP BY COAGULATION ASSAY: 4.3 (ref 0.9–1.1)
MUCUS, URINE: ABNORMAL /LPF
NUGENT SCORE: 4
PROTHROMBIN TIME (PT) IN PPP BY COAGULATION ASSAY: 14.6 SEC (ref 9.8–13.4)
PROTHROMBIN TIME (PT) IN PPP BY COAGULATION ASSAY: 19.6 SEC (ref 9.8–13.4)
PROTHROMBIN TIME (PT) IN PPP BY COAGULATION ASSAY: 28.1 SEC (ref 9.8–13.4)
PROTHROMBIN TIME (PT) IN PPP BY COAGULATION ASSAY: 28.7 SEC (ref 9.8–13.4)
PROTHROMBIN TIME (PT) IN PPP BY COAGULATION ASSAY: 45.7 SEC (ref 9.8–13.4)
PROTHROMBIN TIME (PT) IN PPP BY COAGULATION ASSAY: 50.7 SEC (ref 9.8–13.4)
RBC, URINE: >182 /HPF (ref 0–5)
SQUAMOUS EPITHELIAL CELLS, URINE: 1 /HPF
SQUAMOUS EPITHELIAL CELLS, URINE: 14 /HPF
SQUAMOUS EPITHELIAL CELLS, URINE: 2 /HPF
SQUAMOUS EPITHELIAL CELLS, URINE: 4 /HPF
URINE CULTURE: ABNORMAL
URINE CULTURE: ABNORMAL
URINE CULTURE: NORMAL
URINE CULTURE: NORMAL
VAGINITIS-BV + YEAST INTERPRETATION: ABNORMAL
WBC, URINE: 26 /HPF (ref 0–5)
WBC, URINE: 39 /HPF (ref 0–5)
WBC, URINE: 42 /HPF (ref 0–5)
WBC, URINE: >182 /HPF (ref 0–5)
YEAST: ABNORMAL

## 2023-01-01 PROCEDURE — 99213 OFFICE O/P EST LOW 20 MIN: CPT | Performed by: FAMILY MEDICINE

## 2023-01-01 PROCEDURE — 3078F DIAST BP <80 MM HG: CPT | Performed by: FAMILY MEDICINE

## 2023-01-01 PROCEDURE — 99309 SBSQ NF CARE MODERATE MDM 30: CPT | Performed by: INTERNAL MEDICINE

## 2023-01-01 PROCEDURE — 99232 SBSQ HOSP IP/OBS MODERATE 35: CPT | Performed by: INTERNAL MEDICINE

## 2023-01-01 PROCEDURE — 1160F RVW MEDS BY RX/DR IN RCRD: CPT | Performed by: FAMILY MEDICINE

## 2023-01-01 PROCEDURE — 81001 URINALYSIS AUTO W/SCOPE: CPT

## 2023-01-01 PROCEDURE — 99310 SBSQ NF CARE HIGH MDM 45: CPT | Performed by: PHYSICIAN ASSISTANT

## 2023-01-01 PROCEDURE — 99310 SBSQ NF CARE HIGH MDM 45: CPT | Performed by: INTERNAL MEDICINE

## 2023-01-01 PROCEDURE — 3074F SYST BP LT 130 MM HG: CPT | Performed by: FAMILY MEDICINE

## 2023-01-01 PROCEDURE — 1159F MED LIST DOCD IN RCRD: CPT | Performed by: FAMILY MEDICINE

## 2023-01-01 PROCEDURE — 99490 CHRNC CARE MGMT STAFF 1ST 20: CPT | Performed by: FAMILY MEDICINE

## 2023-01-01 PROCEDURE — 99308 SBSQ NF CARE LOW MDM 20: CPT | Performed by: INTERNAL MEDICINE

## 2023-01-01 PROCEDURE — 99306 1ST NF CARE HIGH MDM 50: CPT | Performed by: INTERNAL MEDICINE

## 2023-01-01 RX ORDER — PHENAZOPYRIDINE HYDROCHLORIDE 200 MG/1
TABLET, FILM COATED ORAL
COMMUNITY
Start: 2022-06-10

## 2023-01-01 RX ORDER — METHADONE HYDROCHLORIDE 10 MG/1
TABLET ORAL
COMMUNITY

## 2023-01-01 RX ORDER — METOLAZONE 5 MG/1
2.5 TABLET ORAL
COMMUNITY
Start: 2019-04-26

## 2023-01-01 RX ORDER — ORPHENADRINE CITRATE 100 MG/1
TABLET, EXTENDED RELEASE ORAL
COMMUNITY

## 2023-01-01 RX ORDER — SPIRONOLACTONE 25 MG/1
25 TABLET ORAL 2 TIMES DAILY
COMMUNITY
Start: 2022-03-16

## 2023-01-01 RX ORDER — WARFARIN 1 MG/1
TABLET ORAL
COMMUNITY

## 2023-01-01 RX ORDER — CIPROFLOXACIN 500 MG/1
500 TABLET ORAL 2 TIMES DAILY
COMMUNITY

## 2023-01-01 RX ORDER — LANOLIN ALCOHOL/MO/W.PET/CERES
1000 CREAM (GRAM) TOPICAL DAILY
COMMUNITY
Start: 2017-10-17

## 2023-01-01 RX ORDER — MIRABEGRON 25 MG/1
TABLET, FILM COATED, EXTENDED RELEASE ORAL
COMMUNITY
Start: 2022-03-23

## 2023-01-01 RX ORDER — FUROSEMIDE 80 MG/1
80 TABLET ORAL DAILY
COMMUNITY
Start: 2016-10-05

## 2023-01-01 RX ORDER — NALOXONE HYDROCHLORIDE 4 MG/.1ML
SPRAY NASAL
COMMUNITY
Start: 2022-03-24

## 2023-01-01 RX ORDER — BLOOD SUGAR DIAGNOSTIC
STRIP MISCELLANEOUS
COMMUNITY

## 2023-01-01 RX ORDER — GABAPENTIN 400 MG/1
800 CAPSULE ORAL 3 TIMES DAILY
COMMUNITY
Start: 2015-09-21

## 2023-01-01 RX ORDER — NYSTATIN 100000 [USP'U]/G
POWDER TOPICAL 2 TIMES DAILY
COMMUNITY

## 2023-01-01 RX ORDER — CLOPIDOGREL BISULFATE 75 MG/1
75 TABLET ORAL DAILY
COMMUNITY

## 2023-01-01 RX ORDER — POTASSIUM CHLORIDE 20 MEQ/1
40 TABLET, EXTENDED RELEASE ORAL 3 TIMES DAILY
COMMUNITY
Start: 2021-11-29

## 2023-01-01 RX ORDER — POLYETHYLENE GLYCOL 3350
POWDER (GRAM) MISCELLANEOUS
COMMUNITY

## 2023-01-01 RX ORDER — VIBEGRON 75 MG/1
1 TABLET, FILM COATED ORAL DAILY
COMMUNITY
Start: 2023-01-01

## 2023-01-01 RX ORDER — WARFARIN 2 MG/1
2 TABLET ORAL DAILY
COMMUNITY

## 2023-01-01 RX ORDER — WARFARIN 4 MG/1
4 TABLET ORAL
COMMUNITY
Start: 2017-04-01

## 2023-01-01 RX ORDER — METOPROLOL SUCCINATE 100 MG/1
100 TABLET, EXTENDED RELEASE ORAL DAILY
COMMUNITY
Start: 2019-03-12

## 2023-01-01 RX ORDER — PANTOPRAZOLE SODIUM 40 MG/1
40 TABLET, DELAYED RELEASE ORAL DAILY
COMMUNITY
Start: 2021-09-03

## 2023-01-01 RX ORDER — WARFARIN 2 MG/1
TABLET ORAL
COMMUNITY

## 2023-01-01 RX ORDER — ASCORBIC ACID 500 MG
1000 TABLET,CHEWABLE ORAL DAILY
COMMUNITY
Start: 2021-11-29

## 2023-01-01 RX ORDER — BISACODYL 10 MG/1
10 SUPPOSITORY RECTAL DAILY
COMMUNITY
Start: 2022-01-01

## 2023-01-01 RX ORDER — TERCONAZOLE 4 MG/G
CREAM VAGINAL
COMMUNITY
Start: 2022-07-05

## 2023-01-01 RX ORDER — CYCLOSPORINE 0.5 MG/ML
1 EMULSION OPHTHALMIC EVERY 12 HOURS
COMMUNITY

## 2023-01-01 RX ORDER — IBUPROFEN 800 MG/1
1 TABLET ORAL EVERY 8 HOURS PRN
COMMUNITY
Start: 2022-09-01

## 2023-01-01 RX ORDER — TAMSULOSIN HYDROCHLORIDE 0.4 MG/1
CAPSULE ORAL
COMMUNITY

## 2023-01-01 RX ORDER — ESTRADIOL 10 UG/1
10 INSERT VAGINAL 2 TIMES WEEKLY
COMMUNITY
Start: 2022-01-01

## 2023-01-01 RX ORDER — ROSUVASTATIN CALCIUM 5 MG/1
5 TABLET, COATED ORAL DAILY
COMMUNITY

## 2023-01-01 RX ORDER — MEMANTINE HYDROCHLORIDE 5 MG/1
5 TABLET ORAL 2 TIMES DAILY
COMMUNITY

## 2023-01-01 RX ORDER — METRONIDAZOLE 7.5 MG/G
GEL VAGINAL
COMMUNITY
Start: 2022-06-20

## 2023-01-01 RX ORDER — AMITRIPTYLINE HYDROCHLORIDE 10 MG/1
10 TABLET, FILM COATED ORAL NIGHTLY
COMMUNITY
Start: 2022-01-01

## 2023-01-01 RX ORDER — NITROGLYCERIN 0.4 MG/1
0.4 TABLET SUBLINGUAL EVERY 5 MIN PRN
COMMUNITY

## 2023-01-01 RX ORDER — CHOLECALCIFEROL (VITAMIN D3) 25 MCG
25 TABLET ORAL DAILY
COMMUNITY
Start: 2017-10-17

## 2023-01-01 RX ORDER — CLOTRIMAZOLE AND BETAMETHASONE DIPROPIONATE 10; .64 MG/G; MG/G
CREAM TOPICAL
COMMUNITY
Start: 2022-06-03

## 2023-01-01 ASSESSMENT — ENCOUNTER SYMPTOMS
SHORTNESS OF BREATH: 1
FATIGUE: 1
UNEXPECTED WEIGHT CHANGE: 1
ABDOMINAL PAIN: 1
FREQUENCY: 1
APPETITE CHANGE: 1
ALLERGIC/IMMUNOLOGIC NEGATIVE: 1
ARTHRALGIAS: 1
EYES NEGATIVE: 1
LIGHT-HEADEDNESS: 1
GASTROINTESTINAL NEGATIVE: 1
RESPIRATORY NEGATIVE: 1
APNEA: 0
HEMATOLOGIC/LYMPHATIC NEGATIVE: 1
HEMATOLOGIC/LYMPHATIC NEGATIVE: 1
ACTIVITY CHANGE: 1
AGITATION: 0
NERVOUS/ANXIOUS: 1
ABDOMINAL DISTENTION: 0
FATIGUE: 1
ACTIVITY CHANGE: 1
ARTHRALGIAS: 1
CHOKING: 0
TROUBLE SWALLOWING: 1
WEAKNESS: 1
NUMBNESS: 1
ENDOCRINE NEGATIVE: 1
LIGHT-HEADEDNESS: 1
DIFFICULTY URINATING: 1
DIARRHEA: 1
BACK PAIN: 1
DYSURIA: 0
RESPIRATORY NEGATIVE: 1
WEAKNESS: 1
SHORTNESS OF BREATH: 1
ARTHRALGIAS: 1
BACK PAIN: 1
DYSPNEA ON EXERTION: 1
CHILLS: 1
SHORTNESS OF BREATH: 1
BACK PAIN: 1
DIAPHORESIS: 0
MYALGIAS: 1
BACK PAIN: 1
SLEEP DISTURBANCE: 1
APPETITE CHANGE: 1
NERVOUS/ANXIOUS: 1
FREQUENCY: 1
ARTHRALGIAS: 1
HEMATOLOGIC/LYMPHATIC NEGATIVE: 1
HEMATURIA: 1
HALLUCINATIONS: 0
ARTHRALGIAS: 1
CHILLS: 1
ACTIVITY CHANGE: 1
FREQUENCY: 1
HYPERACTIVE: 0
CONFUSION: 1
DIFFICULTY URINATING: 0
ABDOMINAL DISTENTION: 0
DECREASED CONCENTRATION: 1
CONFUSION: 1
APPETITE CHANGE: 1
SHORTNESS OF BREATH: 1
ACTIVITY CHANGE: 1
WEAKNESS: 1
SLEEP DISTURBANCE: 0
BRUISES/BLEEDS EASILY: 1
BACK PAIN: 1
LIGHT-HEADEDNESS: 1
DIFFICULTY URINATING: 0
ALLERGIC/IMMUNOLOGIC NEGATIVE: 1
CARDIOVASCULAR NEGATIVE: 1
TROUBLE SWALLOWING: 1
COUGH: 1
NERVOUS/ANXIOUS: 1
SLEEP DISTURBANCE: 1
ACTIVITY CHANGE: 1
ORTHOPNEA: 0
DIZZINESS: 0
BLOOD IN STOOL: 0
EYES NEGATIVE: 1
WEAKNESS: 1
ABDOMINAL PAIN: 0
COUGH: 0
PSYCHIATRIC NEGATIVE: 1
DIFFICULTY BREATHING: 0
FATIGUE: 1
CONSTIPATION: 1
SLEEP DISTURBANCE: 1
FATIGUE: 1
WEAKNESS: 1
FREQUENCY: 1
ALLERGIC/IMMUNOLOGIC NEGATIVE: 1
COUGH: 1
WOUND: 1
DYSURIA: 1

## 2023-01-01 ASSESSMENT — COPD QUESTIONNAIRES: COPD: 1

## 2023-01-18 RX ORDER — MEMANTINE HYDROCHLORIDE 5 MG/1
5 TABLET ORAL 2 TIMES DAILY
Qty: 60 TABLET | Refills: 3 | Status: SHIPPED | OUTPATIENT
Start: 2023-01-18

## 2023-01-18 NOTE — TELEPHONE ENCOUNTER
Pt is requesting medication refill.  Please approve or deny this request.    Rx requested:  Requested Prescriptions     Pending Prescriptions Disp Refills    memantine (NAMENDA) 5 MG tablet 60 tablet 3     Sig: Take 1 tablet by mouth 2 times daily         Last Office Visit:   8/16/2022      Next Visit Date:  Future Appointments   Date Time Provider Justine Hutchison   2/14/2023  1:45 PM Tesfaye Garcia MD [address] Neurology -

## 2023-01-29 PROBLEM — R49.0 DYSPHONIA: Status: ACTIVE | Noted: 2023-01-01

## 2023-01-29 PROBLEM — N39.0 RECURRENT URINARY TRACT INFECTION: Status: ACTIVE | Noted: 2023-01-01

## 2023-01-29 PROBLEM — E66.01 OBESITY, CLASS III, BMI 40-49.9 (MORBID OBESITY) (MULTI): Status: ACTIVE | Noted: 2023-01-01

## 2023-01-29 PROBLEM — L08.9 SKIN INFECTION: Status: ACTIVE | Noted: 2023-01-01

## 2023-01-29 PROBLEM — L03.317 CELLULITIS OF BUTTOCK, RIGHT: Status: ACTIVE | Noted: 2023-01-01

## 2023-01-29 PROBLEM — R42 DIZZINESS: Status: ACTIVE | Noted: 2023-01-01

## 2023-01-29 PROBLEM — I50.32: Status: ACTIVE | Noted: 2023-01-01

## 2023-01-29 PROBLEM — I10 HYPERTENSION, ESSENTIAL: Status: ACTIVE | Noted: 2023-01-01

## 2023-01-29 PROBLEM — E78.2 MIXED HYPERLIPIDEMIA: Status: ACTIVE | Noted: 2023-01-01

## 2023-01-29 PROBLEM — I20.1 PRINZMETAL'S ANGINA (CMS-HCC): Status: ACTIVE | Noted: 2023-01-01

## 2023-01-29 PROBLEM — R32 INCONTINENCE OF URINE: Status: ACTIVE | Noted: 2023-01-01

## 2023-01-29 PROBLEM — R39.89 BLADDER PAIN: Status: ACTIVE | Noted: 2023-01-01

## 2023-01-29 PROBLEM — B37.41 CANDIDA CYSTITIS: Status: ACTIVE | Noted: 2023-01-01

## 2023-01-29 PROBLEM — M19.90 OSTEOARTHRITIS: Status: ACTIVE | Noted: 2023-01-01

## 2023-01-29 PROBLEM — N18.9 CRF (CHRONIC RENAL FAILURE): Status: ACTIVE | Noted: 2023-01-01

## 2023-01-29 PROBLEM — B37.9: Status: ACTIVE | Noted: 2023-01-01

## 2023-01-29 PROBLEM — G62.9 PERIPHERAL NEUROPATHY: Status: ACTIVE | Noted: 2023-01-01

## 2023-01-29 PROBLEM — Z86.39 HISTORY OF DIABETES MELLITUS: Status: ACTIVE | Noted: 2023-01-01

## 2023-01-29 PROBLEM — N32.89 BLADDER MASS: Status: ACTIVE | Noted: 2023-01-01

## 2023-01-29 PROBLEM — N39.0 CHRONIC URINARY TRACT INFECTION: Status: ACTIVE | Noted: 2023-01-01

## 2023-01-29 PROBLEM — S63.501A WRIST SPRAIN, RIGHT, INITIAL ENCOUNTER: Status: ACTIVE | Noted: 2023-01-01

## 2023-01-29 PROBLEM — N94.9 PAIN OF FEMALE GENITALIA: Status: ACTIVE | Noted: 2023-01-01

## 2023-01-29 PROBLEM — N30.90 INFLAMMATION OF BLADDER: Status: ACTIVE | Noted: 2023-01-01

## 2023-01-29 PROBLEM — I87.2 VENOUS INSUFFICIENCY: Status: ACTIVE | Noted: 2023-01-01

## 2023-01-29 PROBLEM — E78.5 HYPERLIPIDEMIA: Status: ACTIVE | Noted: 2023-01-01

## 2023-01-29 PROBLEM — F41.9 ANXIETY AND DEPRESSION: Status: ACTIVE | Noted: 2023-01-01

## 2023-01-29 PROBLEM — S51.811A LACERATION OF RIGHT FOREARM: Status: ACTIVE | Noted: 2023-01-01

## 2023-01-29 PROBLEM — G89.29 CHRONIC PAIN: Status: ACTIVE | Noted: 2023-01-01

## 2023-01-29 PROBLEM — Z86.010 HISTORY OF ADENOMATOUS POLYP OF COLON: Status: ACTIVE | Noted: 2023-01-01

## 2023-01-29 PROBLEM — N76.2 ATROPHIC VULVITIS: Status: ACTIVE | Noted: 2023-01-01

## 2023-01-29 PROBLEM — R82.89 ABNORMAL URINE CYTOLOGY: Status: ACTIVE | Noted: 2023-01-01

## 2023-01-29 PROBLEM — R06.02 SOB (SHORTNESS OF BREATH): Status: ACTIVE | Noted: 2023-01-01

## 2023-01-29 PROBLEM — R35.0 FREQUENCY OF URINATION: Status: ACTIVE | Noted: 2023-01-01

## 2023-01-29 PROBLEM — R10.11 ABDOMINAL PAIN, BILATERAL UPPER QUADRANT: Status: ACTIVE | Noted: 2023-01-01

## 2023-01-29 PROBLEM — N94.9 VAGINAL BURNING: Status: ACTIVE | Noted: 2023-01-01

## 2023-01-29 PROBLEM — N95.2 VAGINAL ATROPHY: Status: ACTIVE | Noted: 2023-01-01

## 2023-01-29 PROBLEM — R30.9 URINARY PAIN: Status: ACTIVE | Noted: 2023-01-01

## 2023-01-29 PROBLEM — L03.119 CELLULITIS OF EXTREMITY: Status: ACTIVE | Noted: 2023-01-01

## 2023-01-29 PROBLEM — E87.6 HYPOKALEMIA: Status: ACTIVE | Noted: 2023-01-01

## 2023-01-29 PROBLEM — R29.6 MULTIPLE FALLS: Status: ACTIVE | Noted: 2023-01-01

## 2023-01-29 PROBLEM — I48.0 PAROXYSMAL ATRIAL FIBRILLATION (MULTI): Status: ACTIVE | Noted: 2023-01-01

## 2023-01-29 PROBLEM — I47.29 NSVT (NONSUSTAINED VENTRICULAR TACHYCARDIA) (MULTI): Status: ACTIVE | Noted: 2023-01-01

## 2023-01-29 PROBLEM — F32.A DEPRESSION: Status: ACTIVE | Noted: 2023-01-01

## 2023-01-29 PROBLEM — N20.0 NEPHROLITHIASIS: Status: ACTIVE | Noted: 2023-01-01

## 2023-01-29 PROBLEM — R30.0 DYSURIA: Status: ACTIVE | Noted: 2023-01-01

## 2023-01-29 PROBLEM — R60.0 LOCALIZED EDEMA: Status: ACTIVE | Noted: 2023-01-01

## 2023-01-29 PROBLEM — M19.039 DJD (DEGENERATIVE JOINT DISEASE) OF WRIST: Status: ACTIVE | Noted: 2023-01-01

## 2023-01-29 PROBLEM — E11.9 DIABETES MELLITUS (MULTI): Status: ACTIVE | Noted: 2023-01-01

## 2023-01-29 PROBLEM — M79.606 LEG PAIN: Status: ACTIVE | Noted: 2023-01-01

## 2023-01-29 PROBLEM — R30.0 BURNING WITH URINATION: Status: ACTIVE | Noted: 2023-01-01

## 2023-01-29 PROBLEM — K21.9 GERD (GASTROESOPHAGEAL REFLUX DISEASE): Status: ACTIVE | Noted: 2023-01-01

## 2023-01-29 PROBLEM — Z95.0 CARDIAC PACEMAKER IN SITU: Status: ACTIVE | Noted: 2023-01-01

## 2023-01-29 PROBLEM — L57.0 ACTINIC KERATOSIS: Status: ACTIVE | Noted: 2023-01-01

## 2023-01-29 PROBLEM — G47.33 OBSTRUCTIVE SLEEP APNEA, ADULT: Status: ACTIVE | Noted: 2023-01-01

## 2023-01-29 PROBLEM — R91.1 PULMONARY NODULE: Status: ACTIVE | Noted: 2023-01-01

## 2023-01-29 PROBLEM — N32.81 OVERACTIVE BLADDER: Status: ACTIVE | Noted: 2023-01-01

## 2023-01-29 PROBLEM — R29.898 LOWER EXTREMITY WEAKNESS: Status: ACTIVE | Noted: 2023-01-01

## 2023-01-29 PROBLEM — S46.911A RIGHT SHOULDER STRAIN: Status: ACTIVE | Noted: 2023-01-01

## 2023-01-29 PROBLEM — F32.A ANXIETY AND DEPRESSION: Status: ACTIVE | Noted: 2023-01-01

## 2023-01-29 PROBLEM — N34.2 CHRONIC URETHRITIS: Status: ACTIVE | Noted: 2023-01-01

## 2023-01-29 PROBLEM — I10 BENIGN ESSENTIAL HYPERTENSION: Status: ACTIVE | Noted: 2023-01-01

## 2023-01-29 PROBLEM — M54.9 BACK PAIN: Status: ACTIVE | Noted: 2023-01-01

## 2023-01-29 PROBLEM — R41.3 MEMORY IMPAIRMENT: Status: ACTIVE | Noted: 2023-01-01

## 2023-01-29 PROBLEM — I20.9 ANGINA PECTORIS (CMS-HCC): Status: ACTIVE | Noted: 2023-01-01

## 2023-01-29 PROBLEM — R19.5 POSITIVE COLORECTAL CANCER SCREENING USING COLOGUARD TEST: Status: ACTIVE | Noted: 2023-01-01

## 2023-01-29 PROBLEM — I25.10 CAD (CORONARY ARTERY DISEASE): Status: ACTIVE | Noted: 2023-01-01

## 2023-01-29 PROBLEM — I49.5 SINUS NODE DYSFUNCTION (MULTI): Status: ACTIVE | Noted: 2023-01-01

## 2023-01-29 PROBLEM — L89.309 BED SORE ON BUTTOCK: Status: ACTIVE | Noted: 2023-01-01

## 2023-01-29 PROBLEM — N76.0 VAGINITIS: Status: ACTIVE | Noted: 2023-01-01

## 2023-01-29 PROBLEM — M25.561 RIGHT KNEE PAIN: Status: ACTIVE | Noted: 2023-01-01

## 2023-01-29 PROBLEM — M17.11 RIGHT KNEE DJD: Status: ACTIVE | Noted: 2023-01-01

## 2023-01-29 PROBLEM — R10.2 PELVIC PAIN: Status: ACTIVE | Noted: 2023-01-01

## 2023-01-29 PROBLEM — R10.2 VAGINAL PAIN: Status: ACTIVE | Noted: 2023-01-01

## 2023-01-29 PROBLEM — K64.9 HEMORRHOIDS: Status: ACTIVE | Noted: 2023-01-01

## 2023-01-29 PROBLEM — J84.9: Status: ACTIVE | Noted: 2023-01-01

## 2023-01-29 PROBLEM — R60.9 EDEMA: Status: ACTIVE | Noted: 2023-01-01

## 2023-01-29 PROBLEM — N32.89 BLADDER SPASM: Status: ACTIVE | Noted: 2023-01-01

## 2023-01-29 PROBLEM — R10.30 GROIN PAIN: Status: ACTIVE | Noted: 2023-01-01

## 2023-01-29 PROBLEM — R10.12 ABDOMINAL PAIN, BILATERAL UPPER QUADRANT: Status: ACTIVE | Noted: 2023-01-01

## 2023-01-29 PROBLEM — J44.9 COPD (CHRONIC OBSTRUCTIVE PULMONARY DISEASE) (MULTI): Status: ACTIVE | Noted: 2023-01-01

## 2023-03-13 NOTE — PROGRESS NOTES
"Subjective   Patient ID: Kayla Kasper is a 87 y.o. female who presents for Follow-up (Patient presented today for a 4 month follow up to medication management.).    Urinary incontenence , new onset    Blood in Urine  This is a new problem. The current episode started today. The problem is unchanged. The hematuria occurs during the initial portion of her urinary stream. The pain is moderate. Irritative symptoms include frequency. Obstructive symptoms include dribbling and incomplete emptying. Associated symptoms include chills. Her past medical history is significant for hypertension, kidney stones and recent infection. Risk factors include anticoagulant, aspirin, chronic kidney disease and antiplatelet therapy.        Review of Systems   Constitutional:  Positive for activity change, chills and fatigue.   HENT: Negative.     Eyes: Negative.    Respiratory: Negative.     Cardiovascular: Negative.    Endocrine: Negative for cold intolerance and heat intolerance.   Genitourinary:  Positive for frequency, hematuria and incomplete emptying.   Musculoskeletal:  Positive for arthralgias, back pain and gait problem.   Allergic/Immunologic: Negative.    Neurological:  Positive for weakness and light-headedness.   Hematological: Negative.    Psychiatric/Behavioral: Negative.         Objective   BP 97/60   Ht 1.651 m (5' 5\")   Wt 109 kg (240 lb)   BMI 39.94 kg/m²     Physical Exam  Constitutional:       Appearance: She is obese.   HENT:      Head: Normocephalic.      Nose: Nose normal.      Mouth/Throat:      Mouth: Mucous membranes are moist.   Cardiovascular:      Rate and Rhythm: Rhythm irregular.      Heart sounds: Murmur heard.   Pulmonary:      Effort: Pulmonary effort is normal.   Abdominal:      Palpations: Abdomen is soft.   Musculoskeletal:         General: Swelling present.      Cervical back: Normal range of motion.      Right lower leg: Edema present.      Left lower leg: Edema present.   Skin:     " Findings: Lesion: vesicles rt leg.   Neurological:      Mental Status: She is alert and oriented to person, place, and time.      Motor: Weakness present.      Gait: Gait abnormal.   Psychiatric:         Mood and Affect: Mood normal.         Behavior: Behavior normal.         Assessment/Plan

## 2023-03-14 NOTE — PROGRESS NOTES
CCM call made. Spoke with pt who stated she still is in pain but nothing new. Had PCP appointment yest. Med list was updated at this time and no new meds added per pt.  helps out as much as able. Pt continues to ambulate with rollater and uses a lift chair at home. No questions or concerns voiced.

## 2023-03-20 NOTE — PROGRESS NOTES
Discharge facility: [facility] [place]  Discharge diagnosis: vaginal pain  Admission date: 3/17/23  Discharge date: 3/18/23    Engagement  Call Start Time: 1133 (3/20/2023 11:39 AM)    Medications  Medications reviewed with patient/caregiver?: Yes (3/20/2023 11:39 AM)  Is the patient having any side effects they believe may be caused by any medication additions or changes?: No (3/20/2023 11:39 AM)  Does the patient have all medications ordered at discharge?: Yes (3/20/2023 11:39 AM)  Care Management Interventions: Nurse provided patient education (3/20/2023 11:39 AM)  Prescription Comments: Does not have vaginal ointment per pt. Was already faxed to specialty pharm and they will be notified when available (3/20/2023 11:39 AM)  Is the patient taking all medications as directed (includes completed medication regime)?: Yes (3/20/2023 11:39 AM)  Care Management Interventions: Nurse provided patient education (3/20/2023 11:39 AM)    Appointments  Does the patient have a primary care provider?: Yes (3/20/2023 11:39 AM)  Care Management Interventions: Educated patient on importance of making appointment; Advised patient to make appointment (3/20/2023 11:39 AM)  Has the patient kept scheduled appointments due by today?: Yes (3/20/2023 11:39 AM)  Care Management Interventions: Educated on importance of keeping appointment (3/20/2023 11:39 AM)  Follow Up Tasks: Appointments (3/20/2023 11:39 AM)    Self Management  Has home health visited the patient within 72 hours of discharge?: Yes (3/20/2023 11:39 AM)    Patient Teaching  Does the patient have access to their discharge instructions?: Yes (3/20/2023 11:39 AM)  Care Management Interventions: Reviewed instructions with patient (3/20/2023 11:39 AM)  What is the patient's perception of their health status since discharge?: Same (3/20/2023 11:39 AM)  Is the patient/caregiver able to teach back the hierarchy of who to call/visit for symptoms/problems? PCP, Specialist, Home Health nurse, Urgent  Care, ED, 911: Yes (3/20/2023 11:39 AM)    Wrap Up  Call End Time: 1145 (3/20/2023 11:39 AM)

## 2023-03-23 PROBLEM — M86.9 OSTEOMYELITIS OF LEFT FOOT, UNSPECIFIED TYPE (MULTI): Status: ACTIVE | Noted: 2023-01-01

## 2023-03-23 PROBLEM — F03.90 DEMENTIA WITHOUT BEHAVIORAL DISTURBANCE, PSYCHOTIC DISTURBANCE, MOOD DISTURBANCE, OR ANXIETY, UNSPECIFIED DEMENTIA SEVERITY, UNSPECIFIED DEMENTIA TYPE (MULTI): Status: ACTIVE | Noted: 2023-01-01

## 2023-03-23 PROBLEM — C80.1 MALIGNANT NEOPLASM (MULTI): Status: ACTIVE | Noted: 2023-01-01

## 2023-03-23 PROBLEM — L97.511: Status: ACTIVE | Noted: 2023-01-01

## 2023-03-23 PROBLEM — Q05.7: Status: ACTIVE | Noted: 2023-01-01

## 2023-03-23 PROBLEM — N18.31 STAGE 3A CHRONIC KIDNEY DISEASE (MULTI): Status: ACTIVE | Noted: 2023-01-01

## 2023-03-23 NOTE — PATIENT INSTRUCTIONS
All reports noted contd meds , diet ,daily X's , weight reduction , FUWOD's , , gyn,  routine skin care , , start PT  @ home,, pelvic training  @ [facility] , wants inc elavil tid x 1wk  , side effects explained ,RTO routine

## 2023-03-23 NOTE — PROGRESS NOTES
"Subjective   Patient ID: Kayla Kasper is a 87 y.o. female who presents for Hospital Follow-up (Patient presented today for a hospital follow up.).    Follow  up hospital         Review of Systems   Constitutional:  Positive for activity change, appetite change, chills and fatigue.   HENT: Negative.     Eyes: Negative.    Respiratory:  Positive for shortness of breath.    Cardiovascular:  Positive for leg swelling.   Gastrointestinal:  Positive for constipation.   Endocrine: Positive for cold intolerance.   Genitourinary:  Positive for difficulty urinating, frequency and vaginal pain.   Musculoskeletal:  Positive for arthralgias, back pain and gait problem.   Allergic/Immunologic: Negative.    Neurological:  Positive for weakness and light-headedness.   Hematological: Negative.    Psychiatric/Behavioral:  Positive for decreased concentration. Negative for agitation, behavioral problems, hallucinations, self-injury, sleep disturbance and suicidal ideas. The patient is nervous/anxious. The patient is not hyperactive.        Objective   /62   Ht 1.575 m (5' 2\")   Wt 109 kg (240 lb)   BMI 43.90 kg/m²     Physical Exam  Constitutional:       Appearance: She is obese.   HENT:      Head: Normocephalic and atraumatic.      Nose: Nose normal.      Mouth/Throat:      Mouth: Mucous membranes are moist.   Cardiovascular:      Rate and Rhythm: Normal rate. Rhythm irregular.      Heart sounds: Murmur heard.   Pulmonary:      Effort: Respiratory distress present.   Abdominal:      Palpations: Abdomen is soft.   Musculoskeletal:         General: Swelling present.      Cervical back: Normal range of motion.      Right lower leg: Edema present.      Left lower leg: Edema present.   Skin:     Findings: Erythema and rash present.   Neurological:      General: No focal deficit present.      Mental Status: She is alert. Mental status is at baseline.   Psychiatric:         Mood and Affect: Mood normal.         Behavior: " Behavior normal.         Assessment/Plan

## 2023-03-30 NOTE — PROGRESS NOTES
TCM outreach done. Spoke with pt who states she still has a lot of vaginal and pelvic pain.  is aware. Still hoping therapy exercises will help per pt.  very active with pt's care. No other concerns voiced at this time.

## 2023-05-05 NOTE — PATIENT INSTRUCTIONS
Contd meds , diet , daily X's weight reduction,contd  OT/PT/ [facility], follow up pain clinic  , oph /pod , derm , noh, , urology, , pm clinic , , [facility]/ Endo, labs reviewed , tcb x 2wks , discussed  withfamily , routine skin care ,monitor BP/BS daily

## 2023-05-05 NOTE — PROGRESS NOTES
Subjective   Patient ID: Kayla Kasper is a 87 y.o. female who presents for No chief complaint on file..    ROV         Review of Systems   Constitutional:  Positive for activity change, appetite change and unexpected weight change.   HENT: Negative.     Eyes:  Positive for visual disturbance.   Respiratory: Negative.     Cardiovascular:  Positive for leg swelling.   Gastrointestinal: Negative.    Endocrine: Negative.    Genitourinary:  Positive for dysuria, frequency and urgency.   Musculoskeletal:  Positive for arthralgias, back pain, gait problem and myalgias.   Skin:  Wound: legs.   Allergic/Immunologic: Negative.    Neurological:  Positive for weakness, light-headedness and numbness.   Hematological: Negative.    Psychiatric/Behavioral:  Positive for confusion and sleep disturbance. The patient is nervous/anxious.        Objective   Wt 108 kg (237 lb)   BMI 43.35 kg/m²     Physical Exam  Skin:     Findings: Erythema and rash present. Bruising: back of thigh.        Assessment/Plan

## 2023-05-22 RX ORDER — MEMANTINE HYDROCHLORIDE 5 MG/1
5 TABLET ORAL 2 TIMES DAILY
Qty: 60 TABLET | Refills: 3 | Status: SHIPPED | OUTPATIENT
Start: 2023-05-22

## 2023-05-23 NOTE — PROGRESS NOTES
CCM call made and pt still is still having a lot of pelvic/vaginal pain. Continues to have P.T. in hopes to use and stretch those muscles that are causing so much discomf. Blood sugars have been running high but couldn't remember the exact readings. Most recent A1C was 7.6.  BP has been managed well with meds. Current med list was updated at last appointment with PCP earlier this month. Continues to ambulate around the house using rollater. No questions or concerns voiced at this time.

## 2023-05-26 PROBLEM — G89.29 CHRONIC BILATERAL LOW BACK PAIN WITHOUT SCIATICA: Status: ACTIVE | Noted: 2023-05-26

## 2023-05-26 PROBLEM — M54.50 CHRONIC BILATERAL LOW BACK PAIN WITHOUT SCIATICA: Status: ACTIVE | Noted: 2023-05-26

## 2023-05-30 ENCOUNTER — OFFICE VISIT (OUTPATIENT)
Dept: NEUROLOGY | Age: 88
End: 2023-05-30
Payer: MEDICARE

## 2023-05-30 VITALS — HEART RATE: 67 BPM | SYSTOLIC BLOOD PRESSURE: 112 MMHG | DIASTOLIC BLOOD PRESSURE: 80 MMHG

## 2023-05-30 DIAGNOSIS — G30.1 MODERATE LATE ONSET ALZHEIMER'S DEMENTIA WITHOUT BEHAVIORAL DISTURBANCE, PSYCHOTIC DISTURBANCE, MOOD DISTURBANCE, OR ANXIETY (HCC): ICD-10-CM

## 2023-05-30 DIAGNOSIS — E10.42 DIABETIC POLYNEUROPATHY ASSOCIATED WITH TYPE 1 DIABETES MELLITUS (HCC): ICD-10-CM

## 2023-05-30 DIAGNOSIS — F02.B0 MODERATE LATE ONSET ALZHEIMER'S DEMENTIA WITHOUT BEHAVIORAL DISTURBANCE, PSYCHOTIC DISTURBANCE, MOOD DISTURBANCE, OR ANXIETY (HCC): ICD-10-CM

## 2023-05-30 DIAGNOSIS — G31.84 MILD COGNITIVE IMPAIRMENT WITH MEMORY LOSS: Primary | ICD-10-CM

## 2023-05-30 DIAGNOSIS — R35.0 URINARY FREQUENCY: ICD-10-CM

## 2023-05-30 PROCEDURE — 1090F PRES/ABSN URINE INCON ASSESS: CPT | Performed by: PSYCHIATRY & NEUROLOGY

## 2023-05-30 PROCEDURE — G8427 DOCREV CUR MEDS BY ELIG CLIN: HCPCS | Performed by: PSYCHIATRY & NEUROLOGY

## 2023-05-30 PROCEDURE — G8400 PT W/DXA NO RESULTS DOC: HCPCS | Performed by: PSYCHIATRY & NEUROLOGY

## 2023-05-30 PROCEDURE — G8417 CALC BMI ABV UP PARAM F/U: HCPCS | Performed by: PSYCHIATRY & NEUROLOGY

## 2023-05-30 PROCEDURE — 3078F DIAST BP <80 MM HG: CPT | Performed by: PSYCHIATRY & NEUROLOGY

## 2023-05-30 PROCEDURE — 3074F SYST BP LT 130 MM HG: CPT | Performed by: PSYCHIATRY & NEUROLOGY

## 2023-05-30 PROCEDURE — 99214 OFFICE O/P EST MOD 30 MIN: CPT | Performed by: PSYCHIATRY & NEUROLOGY

## 2023-05-30 PROCEDURE — 1036F TOBACCO NON-USER: CPT | Performed by: PSYCHIATRY & NEUROLOGY

## 2023-05-30 PROCEDURE — 1123F ACP DISCUSS/DSCN MKR DOCD: CPT | Performed by: PSYCHIATRY & NEUROLOGY

## 2023-05-30 RX ORDER — MEMANTINE HYDROCHLORIDE 10 MG/1
10 TABLET ORAL 2 TIMES DAILY
Qty: 180 TABLET | Refills: 1 | Status: SHIPPED | OUTPATIENT
Start: 2023-05-30

## 2023-05-30 ASSESSMENT — ENCOUNTER SYMPTOMS
CHOKING: 0
BACK PAIN: 0
NAUSEA: 0
PHOTOPHOBIA: 0
TROUBLE SWALLOWING: 0
VOMITING: 0
COLOR CHANGE: 0
SHORTNESS OF BREATH: 0

## 2023-05-30 NOTE — PROGRESS NOTES
Subjective:      Patient ID: Brittani Love is a 82 y.o. female who presents today for:  Chief Complaint   Patient presents with    Follow-up     Pt and  states things are getting worse. Pt is having issues with remembering more simple things, names, and doing things that she has done for years. . Pt is confused and doesn't always remember how to take her insulin. Pt and  only concern is the progression in her memory. HPI 82 right-handed female with a history of cognitive impairment. Patient is on Namenda 5 g twice a day. She is also Aricept 10 mg. Patient continues to decline more than she was patient that she does not sleep well as she is going to bathroom anytime. She has been tried on multiple medications and she is allergic to many medications. She is not any falls. She does complain of difficulty raising her left arm and this is from her shoulder and shoulder arthritis. She had kidney stone treatment and since then has had increased frequency of micturition. Patient is requiring more help at home. No shortness of been well controlled. She does not sleep well  No past medical history on file. No past surgical history on file.   Social History     Socioeconomic History    Marital status:      Spouse name: Not on file    Number of children: Not on file    Years of education: Not on file    Highest education level: Not on file   Occupational History    Not on file   Tobacco Use    Smoking status: Never    Smokeless tobacco: Never   Substance and Sexual Activity    Alcohol use: Not on file    Drug use: Not on file    Sexual activity: Not on file   Other Topics Concern    Not on file   Social History Narrative    Not on file     Social Determinants of Health     Financial Resource Strain: Not on file   Food Insecurity: Not on file   Transportation Needs: Not on file   Physical Activity: Not on file   Stress: Not on file   Social Connections: Not on file   Intimate Partner

## 2023-06-18 NOTE — LETTER
Patient: Kayla Kasper  : 1935    Encounter Date: 2023    Subjective  Patient ID: Kayla Kasper is a 87 y.o. female who is acute skilled care and presents for initial visit for skilled nursing.    This 87-year-old female patient is going to be transferred to me as the patient's physician recently retired.  As I reviewed this patient's chart it appears that she has been in and out of the hospital for last several weeks now, she is morbidly obese female patient usually homebound and she has a spouse.  Patient has had this pulmonary infiltrate which has been seen on a last few imaging studies with consolidative opacity and atelectasis.  Also patient has some CO2 retention and mild respiratory acidosis.  Patient has a previous history of coronary artery disease with PCI, ejection fraction has been reported to be normal.  There is no evidence of any systolic impairment.  Patient has history of obstructive sleep apnea perhaps obesity hypoventilation.  Patient has been reported to have a cognitive impairment because patient is on Namenda treatment.  When I saw this patient patient's her 2 daughters were present at bedside.  Also patient has been suffering from chronic edema although creatinine has been normal, she has a longstanding history of diabetes taking the mix fixed dose combination insulin therapy with fluctuating blood sugars.  Patient also has been on escalating dose of diuretics because of chronic edema of the legs not necessarily secondary to systemic fluid overload seems to be.  Patient has history of hyperlipidemia, chronic urinary incontinence, recurrent cystitis.  She has been on warfarin therapy because of history of atrial fibrillation, there are several reports and information and diagnosis list in this patient's chart and those were reviewed and all the reports and previous investigations and EKGs were reviewed.  When I interviewed the patient there was 2 daughters at bedside and they  have some questions and patient's spouse was not available at the time but she did not have much breathing compromise they were talking about some stomach ailment diarrheal spells and bladder spasms.  Patient has a swallowing test done and noticed to have some swallowing difficulties so patient is on modified dysphagia diet so patient has a thickened liquids at bedside.  It does not look like this patient's condition is improving even after back-and-forth hospitalization and as patient is going to be seen by me I took over this patient's care.         Review of Systems   Constitutional:  Positive for activity change, appetite change and fatigue. Negative for diaphoresis.   HENT:  Positive for trouble swallowing. Negative for congestion and tinnitus.    Eyes:  Negative for visual disturbance.   Respiratory:  Positive for cough and shortness of breath. Negative for apnea and choking.    Cardiovascular:  Positive for leg swelling.   Gastrointestinal:  Positive for diarrhea. Negative for abdominal distention, abdominal pain and blood in stool.   Genitourinary:  Positive for frequency. Negative for difficulty urinating and dysuria.   Musculoskeletal:  Positive for arthralgias, back pain and gait problem.   Allergic/Immunologic: Negative for immunocompromised state.   Neurological:  Positive for weakness. Negative for dizziness.   Hematological:  Bruises/bleeds easily.   Psychiatric/Behavioral:  Positive for sleep disturbance.        Objective  There were no vitals taken for this visit.    Physical Exam  Constitutional:       Appearance: Normal appearance. She is obese.   HENT:      Head: Normocephalic.      Nose: Nose normal.   Eyes:      Conjunctiva/sclera: Conjunctivae normal.   Cardiovascular:      Rate and Rhythm: Normal rate. Rhythm irregular.      Heart sounds: Normal heart sounds.   Pulmonary:      Effort: Pulmonary effort is normal.      Breath sounds: Normal breath sounds.   Abdominal:      Palpations: Abdomen  is soft.   Musculoskeletal:         General: Swelling and tenderness present.      Cervical back: Normal range of motion and neck supple.   Skin:     General: Skin is warm and dry.      Findings: Bruising present.   Neurological:      General: No focal deficit present.      Mental Status: She is oriented to person, place, and time. Mental status is at baseline.   Psychiatric:         Mood and Affect: Mood normal.         Judgment: Judgment normal.         Assessment/Plan  Problem List Items Addressed This Visit       Anxiety and depression    CAD (coronary artery disease)    Diabetes mellitus (CMS/[facility])    Obstructive sleep apnea, adult    Obesity, Class III, BMI 40-49.9 (morbid obesity) (CMS/[facility])    Osteoarthritis - Primary     Other Visit Diagnoses       Pneumonia of both lungs due to infectious organism, unspecified part of lung            So as far as the pulmonary infiltrate is concerned this is going to take some time, patient could be hypoxic, patient does not have adequate respiratory reserve, she is morbidly obese she has atelectasis, she is not keeping up with a full aeration of the lungs.  Patient is listed to be on amitriptyline, atorvastatin, clopidogrel, Lasix 80 mg once a day, gabapentin 800 mg twice a day, Humulin 70/30 54 units in the morning and 34 units in the evening, Namenda, metolazone once a week, metoprolol 100 mg, oxycodone, pantoprazole, potassium chloride, Viibryd Lidia, spironolactone, vitamins, warfarin.  Therapeutics are quite complex and needs to be streamlined.  Patient will be evaluated again by myself after reviewing records and information and most likely I will try to cut down patient's diuretics, oxycodone 20 mg is excessive amount of therapeutics and I am afraid that it can cause respiratory depression and hypoventilation.  That will be taken off, as I am doing the documentation of this patient I noticed that patient was sent to emergency room and admitted again for pulmonary  infiltrate and hypoxemia so this cycle is not going to be stopped.  Patient's condition will be reassessed when she returns back to the facility, definitely is going to look at the medication list and trying to cut down some therapeutics.  Discussed with family at least before patient got ill on the same day evening and patient will require prolonged stay and rehabilitation provided she does not end up in the hospital, there is no angina, there is no fluid overload, there is no cystitis, there is no abnormal renal functions.  Medications were reviewed, laboratories will be done and followed, swallow test were reviewed.     Goals    None           Electronically Signed By: Johnnie Swanson MD   6/19/23  9:03 PM

## 2023-06-20 NOTE — PROGRESS NOTES
Subjective   Patient ID: Kayla Kasper is a 87 y.o. female who is acute skilled care and presents for initial visit for skilled nursing.    This 87-year-old female patient is going to be transferred to me as the patient's physician recently retired.  As I reviewed this patient's chart it appears that she has been in and out of the hospital for last several weeks now, she is morbidly obese female patient usually homebound and she has a spouse.  Patient has had this pulmonary infiltrate which has been seen on a last few imaging studies with consolidative opacity and atelectasis.  Also patient has some CO2 retention and mild respiratory acidosis.  Patient has a previous history of coronary artery disease with PCI, ejection fraction has been reported to be normal.  There is no evidence of any systolic impairment.  Patient has history of obstructive sleep apnea perhaps obesity hypoventilation.  Patient has been reported to have a cognitive impairment because patient is on Namenda treatment.  When I saw this patient patient's her 2 daughters were present at bedside.  Also patient has been suffering from chronic edema although creatinine has been normal, she has a longstanding history of diabetes taking the mix fixed dose combination insulin therapy with fluctuating blood sugars.  Patient also has been on escalating dose of diuretics because of chronic edema of the legs not necessarily secondary to systemic fluid overload seems to be.  Patient has history of hyperlipidemia, chronic urinary incontinence, recurrent cystitis.  She has been on warfarin therapy because of history of atrial fibrillation, there are several reports and information and diagnosis list in this patient's chart and those were reviewed and all the reports and previous investigations and EKGs were reviewed.  When I interviewed the patient there was 2 daughters at bedside and they have some questions and patient's spouse was not available at the time  but she did not have much breathing compromise they were talking about some stomach ailment diarrheal spells and bladder spasms.  Patient has a swallowing test done and noticed to have some swallowing difficulties so patient is on modified dysphagia diet so patient has a thickened liquids at bedside.  It does not look like this patient's condition is improving even after back-and-forth hospitalization and as patient is going to be seen by me I took over this patient's care.         Review of Systems   Constitutional:  Positive for activity change, appetite change and fatigue. Negative for diaphoresis.   HENT:  Positive for trouble swallowing. Negative for congestion and tinnitus.    Eyes:  Negative for visual disturbance.   Respiratory:  Positive for cough and shortness of breath. Negative for apnea and choking.    Cardiovascular:  Positive for leg swelling.   Gastrointestinal:  Positive for diarrhea. Negative for abdominal distention, abdominal pain and blood in stool.   Genitourinary:  Positive for frequency. Negative for difficulty urinating and dysuria.   Musculoskeletal:  Positive for arthralgias, back pain and gait problem.   Allergic/Immunologic: Negative for immunocompromised state.   Neurological:  Positive for weakness. Negative for dizziness.   Hematological:  Bruises/bleeds easily.   Psychiatric/Behavioral:  Positive for sleep disturbance.        Objective   There were no vitals taken for this visit.    Physical Exam  Constitutional:       Appearance: Normal appearance. She is obese.   HENT:      Head: Normocephalic.      Nose: Nose normal.   Eyes:      Conjunctiva/sclera: Conjunctivae normal.   Cardiovascular:      Rate and Rhythm: Normal rate. Rhythm irregular.      Heart sounds: Normal heart sounds.   Pulmonary:      Effort: Pulmonary effort is normal.      Breath sounds: Normal breath sounds.   Abdominal:      Palpations: Abdomen is soft.   Musculoskeletal:         General: Swelling and tenderness  present.      Cervical back: Normal range of motion and neck supple.   Skin:     General: Skin is warm and dry.      Findings: Bruising present.   Neurological:      General: No focal deficit present.      Mental Status: She is oriented to person, place, and time. Mental status is at baseline.   Psychiatric:         Mood and Affect: Mood normal.         Judgment: Judgment normal.         Assessment/Plan   Problem List Items Addressed This Visit       Anxiety and depression    CAD (coronary artery disease)    Diabetes mellitus (CMS/[facility])    Obstructive sleep apnea, adult    Obesity, Class III, BMI 40-49.9 (morbid obesity) (CMS/[facility])    Osteoarthritis - Primary     Other Visit Diagnoses       Pneumonia of both lungs due to infectious organism, unspecified part of lung            So as far as the pulmonary infiltrate is concerned this is going to take some time, patient could be hypoxic, patient does not have adequate respiratory reserve, she is morbidly obese she has atelectasis, she is not keeping up with a full aeration of the lungs.  Patient is listed to be on amitriptyline, atorvastatin, clopidogrel, Lasix 80 mg once a day, gabapentin 800 mg twice a day, Humulin 70/30 54 units in the morning and 34 units in the evening, Namenda, metolazone once a week, metoprolol 100 mg, oxycodone, pantoprazole, potassium chloride, Viibryd Lidia, spironolactone, vitamins, warfarin.  Therapeutics are quite complex and needs to be streamlined.  Patient will be evaluated again by myself after reviewing records and information and most likely I will try to cut down patient's diuretics, oxycodone 20 mg is excessive amount of therapeutics and I am afraid that it can cause respiratory depression and hypoventilation.  That will be taken off, as I am doing the documentation of this patient I noticed that patient was sent to emergency room and admitted again for pulmonary infiltrate and hypoxemia so this cycle is not going to be stopped.   Patient's condition will be reassessed when she returns back to the facility, definitely is going to look at the medication list and trying to cut down some therapeutics.  Discussed with family at least before patient got ill on the same day evening and patient will require prolonged stay and rehabilitation provided she does not end up in the hospital, there is no angina, there is no fluid overload, there is no cystitis, there is no abnormal renal functions.  Medications were reviewed, laboratories will be done and followed, swallow test were reviewed.     Goals    None

## 2023-06-28 NOTE — LETTER
Patient: Kayla Kasper  : 1935    Encounter Date: 2023    Subjective  Patient ID: Kayla Kasper is a 87 y.o. female who is acute skilled care and presents for initial visit for skilled nursing.    86-year-old female patient has been admitted third time, when I saw her last time and seen in the evening she developed shortness of breath, she continued to have a consolidative opacity in the right lower lobe which has been there for several weeks now, patient was found to have recurrent aspiration pneumonia, previously her modified barium swallow was not normal, at this time patient has n.p.o. status, they inserted nasogastric tube, family undecided about gastrostomy tube so simply they just put nasogastric tube and discharge patient, patient received cefepime therapy, patient's condition is looking worse, she is quite uncomfortable, her functional decline is progressive and persistent.  Patient has been suffering from morbid obesity, cognitive impairment, atrial fibrillation, has been on anticoagulation therapy, she has a COPD, pacemaker implant, sick sinus syndrome.  They found that she was having some sort of cystitis, there was a distention of urinary bladder with some hydroureter, patient has a decompression with a Lai catheter placement.  When I saw this patient there was no family member at bedside.  Patient continues to receive several therapeutics and now NG tube feeding continuously and also flushed with free water.  Patient's condition looks worse as mentioned some back here in the facility and I am not sure how well this patient will do during her stay here at this time and interestingly did discharge patient with Humulin 70/30 even though someone is on a continuous nasogastric tube feeding, discharge medication list has not been done properly, patient has extremely difficult mobility, patient is showing further deterioration in her functional status.  The discharge patient with  meropenem therapy to be given till 3 July so today patient was assessed by me and will be back here in skilled nursing and rehabilitation.  All other reports and information during hospitalization were reviewed, discharge summary was reviewed.         Review of Systems  onstitutional:  Positive for activity change, appetite change and fatigue. Negative for diaphoresis.   HENT:  Positive for trouble swallowing. Negative for congestion and tinnitus.    Eyes:  Negative for visual disturbance.   Respiratory:  Positive for cough and shortness of breath. Negative for apnea and choking.    Cardiovascular:  Positive for leg swelling.   Gastrointestinal:  Positive for diarrhea. Negative for abdominal distention, abdominal pain and blood in stool.   Genitourinary:  Positive for frequency. Negative for difficulty urinating and dysuria.   Musculoskeletal:  Positive for arthralgias, back pain and gait problem.   Allergic/Immunologic: Negative for immunocompromised state.   Neurological:  Positive for weakness. Negative for dizziness.   Hematological:  Bruises/bleeds easily.   Psychiatric/Behavioral:  Positive for sleep disturbance.      Objective  /86   Pulse 90   Wt 106 kg (233 lb)   BMI 42.62 kg/m²     Physical Exam  Constitutional:       Appearance: Normal appearance but uncomfortable. She is obese.   HENT:      Head: Normocephalic.      Nose: Nose normal.   Eyes:      Conjunctiva/sclera: Conjunctivae normal.   Cardiovascular:      Rate and Rhythm: Normal rate. Rhythm irregular.      Heart sounds: Normal heart sounds.   Pulmonary:      Effort: Pulmonary effort is normal.      Breath sounds: rales RLL.  Abdominal:      Palpations: Abdomen is soft. Lai present, Corpak present  Musculoskeletal:         General: Swelling and tenderness present.      Cervical back: Normal range of motion and neck supple.   Skin:     General: Skin is warm and dry.      Findings: Bruising present.   Neurological:      General: No focal  deficit present.      Mental Status: She is not oriented to person, place, and time. Mental status is at baseline.   Psychiatric:         Mood and Affect: Mood anxious, restless    Assessment/Plan  Problem List Items Addressed This Visit       CAD (coronary artery disease)    Cardiac pacemaker in situ    COPD (chronic obstructive pulmonary disease) (CMS/[facility])    Diabetes mellitus (CMS/[facility])    Dementia without behavioral disturbance, psychotic disturbance, mood disturbance, or anxiety, unspecified dementia severity, unspecified dementia type (CMS/[facility])     Other Visit Diagnoses       Pneumonia of both lungs due to infectious organism, unspecified part of lung    -  Primary    Hemorrhagic cystitis        Oropharyngeal dysphagia        Atrial fibrillation, unspecified type (CMS/[facility])            2 complicated medical problems now aspiration pneumonia is recurrent consolidative opacity will not improve that easily, meropenem will be given for duration of time through peripheral IV.  Patient is listed to be on meropenem, Elavil which will be stopped, clopidogrel, probiotics, Lasix, gabapentin, Humulin 70/30 which will be changed to Lantus insulin with 6 hourly blood sugar monitoring, Namenda, metolazone once a week, metoprolol, oxycodone which will be 20 mg every 4 hours which is extremely high dose of oxycodone but it will be given, pantoprazole, potassium chloride, rosuvastatin, spironolactone, Viibryd Lidia, warfarin.  PT/INR will be done and PT/INR are going to fluctuate significantly because of nasogastric tube feeding.  Dementia is progressive, breathing compromise persist, after 10 days we will attempt to do either modified barium swallow or family discussion will be carried out to consider gastrostomy tube because swallowing is not admitted to improve, physical therapy, Occupational Therapy, speech therapy evaluation will be done.  No angina, shortness of breath persist, cognitive impairment is intermittent and  fluctuating, atrial fibrillation persist.  Patient's mobility remains severely impaired, susceptible for pressure sores, susceptible for further aspiration with the nasogastric tube feeding, Lai catheter will not be removed.  Patient's prognosis is guarded, DNR CCA reviewed.  Would talk with family, last time we have a discussion with patient's daughters.  Do not see that patient is going to go home anytime.  High risk for hospitalization, diagnosis list were reviewed, plan of care was reviewed, excellent nursing care has been provided, head end of bed has to be kept elevated all the times, periodic laboratory assessment will be done, blood sugars will be monitored, notify physician for any significant decline in the clinical condition I told nursing staff.  We will continue to follow patient's clinical condition and prognosis remains guarded.     Goals    None           Electronically Signed By: Johnnie Swanson MD   6/29/23  8:36 PM   intact

## 2023-06-30 NOTE — LETTER
"Patient: Kayla Symbol  : 1935    Encounter Date: 2023  Name: Kayla ALEXANDER Symbol  YOB: 1935    Chief complaint: Aspiration pneumonia.     HPI: This is a 87 year old  female who has a medical history remarkable for obesity, diabetes, COPD, stage 3 CKD, atrial fibrillation, pacemaker MARCELA, and CAD,.Patient was brought to ER from [facility] were she was recovering from hemorrhagic cystitis and aspiration pneumonia with concern for difficulty breathing and increasing O2 requirement. Chest x-ray demonstrated increased left basilar and right midlung airspace opacity suspicious for pneumonia. Urine positive for UTI. She was treated with  cefepime and doxycycline. Patient had previously documented  oropharyngeal dysphagia which was felt to had contributed to pneumonia. Therefore, patient was kept NPO and had Corpak tube placement for nutritional support. She will be re evaluated for PEG tube if no improvement is noted in next week. Patient antibiotics were changed to Meropenem for additional week. She was discharged back to [facility] for rehab.    Pneumonia  She complains of cough and shortness of breath. There is no difficulty breathing. This is a recurrent problem. The current episode started in the past 7 days. The problem occurs daily. The problem has been gradually improving. Associated symptoms include dyspnea on exertion and trouble swallowing. Pertinent negatives include no nasal congestion or orthopnea. Her symptoms are aggravated by minimal activity. Relieved by: antibiotics. oxygen. She reports moderate improvement on treatment. Her past medical history is significant for COPD.       Review of systems:   ROS negative except were noted in HPI.    Code Status: DNR-CC    /87   Pulse 77   Temp 36.7 °C (98 °F)   Resp 18   Ht 1.676 m (5' 6\")   Wt 106 kg (233 lb)   SpO2 98%   BMI 37.61 kg/m²      Physical Exam  Constitutional:       Appearance: She is " obese. She is ill-appearing.      Comments:      HENT:      Nose: Nose normal. No congestion.      Mouth/Throat:      Mouth: Mucous membranes are moist.      Comments: Corpak feeding tube.  Eyes:      Extraocular Movements: Extraocular movements intact.      Pupils: Pupils are equal, round, and reactive to light.   Cardiovascular:      Rate and Rhythm: Normal rate. Rhythm irregular.      Pulses: Normal pulses.   Pulmonary:      Effort: Pulmonary effort is normal.      Breath sounds: Rhonchi present.   Abdominal:      General: Bowel sounds are normal. There is no distension.      Palpations: Abdomen is soft.      Tenderness: There is no abdominal tenderness. There is no guarding.   Genitourinary:     Comments: Lai   Musculoskeletal:         General: Normal range of motion.      Cervical back: Normal range of motion.   Lymphadenopathy:      Cervical: Cervical adenopathy present.   Skin:     General: Skin is warm and dry.      Findings: Bruising present.   Neurological:      General: No focal deficit present.      Mental Status: She is alert. Mental status is at baseline.   Psychiatric:      Comments: Anxious        Medications reviewed during visit at facility.  Lasix 80 mg per peg daily  Tylenol 500 mg per peg q 4 hour prn  Gabapentin 400 mg per peg tid  MOM 30 ml per peg daily prn constipation  Vibegron 75 mg per peg daily  Rosuvastatin 5 mg per pegdaily  Meropenem 1 gram IV q 8 hours  Memantine 10 mg/ 5 ml  2.5 ml per peg  Plavix 75 mg per peg daily  Potassium chloride 20 meq/ 15 ml  15 ml per peg daily  Culturelle one capsule per peg daily  Spironolactone 25 mg / 5 ml 5 ml per peg daily  Vitamin B 12 1000 mcg per peg daily  Metolazone 5 mg 1/2 tablet per peg on Sat.  Vitamin C 1000 mg per peg daily  Humalog sliding scale insulin coverage  Lantus 40 units subcutaneous daily  Oxycodone 5 mg / 5 ml  20 ml per peg q 6 hours prn  Omeprazole 40 mg per peg daily  Metoprolol tartrate 50 mg per peg bid  Warfarin 3 mg per  peg daily    Labs reviewed at facility:   Laboratory Service Report [id number]   Patient Name   SYMBOL,JOHANA  Patient ID   [id number]  Age   87 Y  Gender   F  Order #      Ordering CATRINA Sherman  Patient Telephone #   N/A     1935  AKA      Client Order #   [id number]   Collection Date and Time   2023 07:45   Print Date and Time   2023 17:09  Account Information   [facility] [place]   [id number] [place] [address]     Report Notes                        Test Results   Reference Perform  Unit  Value Site*             CBC and Differential  REPORTED 2023 10:14  White Blood Cell Count   7.70 k/uL 3.70-11.00    RBC   3.91 m/uL 3.90-5.20    Hemoglobin   12.0 g/dL 11.5-15.5    Hematocrit   38.4 % 36.0-46.0    MCV   98.2 fL 80.0-100.0    MCH   30.7 pg 26.0-34.0    MCHC   31.3 g/dL 30.5-36.0    RDW-CV H 17.4 % 11.5-15.0    Platelet Count L 107 k/uL 150-400    Results may be inaccurate due to the presence of microclots.  MPV   9.9 fL 9.0-12.7    Neut%   59.8 %    Abs Neut   4.60 k/uL 1.45-7.50    Lymph%   20.0 %    Abs Lymph   1.54 k/uL 1.00-4.00    Mono%   10.1 %    Abs Mono   0.78 k/uL <0.87    Eosin%   7.8 %    Abs Eosin H 0.60 k/uL <0.46    Baso%   1.0 %    Abs Baso   0.08 k/uL <0.11    Immature Gran %%   1.3 %    Abs Immature Gran H 0.10 k/uL <0.10    Absolute nRBC   0.0 /100 WBC    Absolute nRBC   <0.01 k/uL <0.01    Diff Type   Auto               Comp Metabolic Panel  REPORTED 2023 10:31  Protein, Total L 5.6 g/dL 6.3-8.0    Albumin L 2.2 g/dL 3.9-4.9    Calcium, Total   10.1 mg/dL 8.5-10.2    Bilirubin, Total   0.3 mg/dL 0.2-1.3    Alkaline Phosphatase H 137 U/L     AST   35 U/L 13-35    ALT   14 U/L 7-38    Glucose H 157 mg/dL 74-99   BUN H 30 mg/dL 7-21    Creatinine   0.66 mg/dL 0.58-0.96    Sodium   143 mmol/L 136-144    Potassium   4.6 mmol/L 3.7-5.1    Chloride H 108 mmol/L     CO2   28 mmol/L 22-30    Anion Gap L 7 mmol/L 9-18     Estimated Glomerular Filtration Rate   86 mL/min/1.73 meters squared >=60   Assessment/Plan   Problem List Items Addressed This Visit       Diabetes mellitus type 2, insulin dependent (CMS/[facility])     Review blood sugars. Blood sugar today 231. Continue with Lantus insulin 40 units subcutaneous daily. Humalog sliding scale insulin coverage.         Dementia without behavioral disturbance, psychotic disturbance, mood disturbance, or anxiety, unspecified dementia severity, unspecified dementia type (CMS/[facility])     Memantine 10 mg/ 5 ml  2.5 ml per peg         Aspiration pneumonia (CMS/[facility]) - Primary     NPO. Nutrition and medications per Corpak. Schedule repeat swallow evaluation.         Moderate protein malnutrition (CMS/[facility])     Diabetasource 1.2 via ng tube continuous at 67cc/hr via pump and use flush bag to administer a water flush at 25 cc/hr. Review labs. CMP CBC with diff q week.          Impaired functional mobility, balance, gait, and endurance     Order PT and OT to assess and treat.         Pain     Oxycodone 5 mg / 5 ml  20 ml per peg q 6 hours prn            Time:  I spent 45 minutes or greater with the patient. Greater than 50% of this time was spent in counseling and or coordination of care. The time includes prep time of reviewing vital signs, report from direct nursing staff and or therapists, hospital documentation, reviewing labs, radiographs, diagnostic tests and or consultations, time directly spent with the patient interviewing, examining, and education regarding diagnosis, treatments, and medications, as well as documentation in the electronic medical record, and reviewing the plan of care and any new orders with the patient, nursing staff and other staff directly related to the patients care.      Wing Ceja PA-C       Electronically Signed By: Wing Ceja PA-C   7/2/23  9:43 PM

## 2023-06-30 NOTE — PROGRESS NOTES
"6/30/2023  Name: Kayla ALEXANDER Symbol  YOB: 1935    Chief complaint: Aspiration pneumonia.     HPI: This is a 87 year old  female who has a medical history remarkable for obesity, diabetes, COPD, stage 3 CKD, atrial fibrillation, pacemaker MARCELA, and CAD,.Patient was brought to ER from [facility] were she was recovering from hemorrhagic cystitis and aspiration pneumonia with concern for difficulty breathing and increasing O2 requirement. Chest x-ray demonstrated increased left basilar and right midlung airspace opacity suspicious for pneumonia. Urine positive for UTI. She was treated with  cefepime and doxycycline. Patient had previously documented  oropharyngeal dysphagia which was felt to had contributed to pneumonia. Therefore, patient was kept NPO and had Corpak tube placement for nutritional support. She will be re evaluated for PEG tube if no improvement is noted in next week. Patient antibiotics were changed to Meropenem for additional week. She was discharged back to [facility] for rehab.    Pneumonia  She complains of cough and shortness of breath. There is no difficulty breathing. This is a recurrent problem. The current episode started in the past 7 days. The problem occurs daily. The problem has been gradually improving. Associated symptoms include dyspnea on exertion and trouble swallowing. Pertinent negatives include no nasal congestion or orthopnea. Her symptoms are aggravated by minimal activity. Relieved by: antibiotics. oxygen. She reports moderate improvement on treatment. Her past medical history is significant for COPD.       Review of systems:   ROS negative except were noted in HPI.    Code Status: DNR-CC    /87   Pulse 77   Temp 36.7 °C (98 °F)   Resp 18   Ht 1.676 m (5' 6\")   Wt 106 kg (233 lb)   SpO2 98%   BMI 37.61 kg/m²      Physical Exam  Constitutional:       Appearance: She is obese. She is ill-appearing.      Comments:      HENT:      Nose: Nose normal. " No congestion.      Mouth/Throat:      Mouth: Mucous membranes are moist.      Comments: Corpak feeding tube.  Eyes:      Extraocular Movements: Extraocular movements intact.      Pupils: Pupils are equal, round, and reactive to light.   Cardiovascular:      Rate and Rhythm: Normal rate. Rhythm irregular.      Pulses: Normal pulses.   Pulmonary:      Effort: Pulmonary effort is normal.      Breath sounds: Rhonchi present.   Abdominal:      General: Bowel sounds are normal. There is no distension.      Palpations: Abdomen is soft.      Tenderness: There is no abdominal tenderness. There is no guarding.   Genitourinary:     Comments: Lai   Musculoskeletal:         General: Normal range of motion.      Cervical back: Normal range of motion.   Lymphadenopathy:      Cervical: Cervical adenopathy present.   Skin:     General: Skin is warm and dry.      Findings: Bruising present.   Neurological:      General: No focal deficit present.      Mental Status: She is alert. Mental status is at baseline.   Psychiatric:      Comments: Anxious        Medications reviewed during visit at facility.  Lasix 80 mg per peg daily  Tylenol 500 mg per peg q 4 hour prn  Gabapentin 400 mg per peg tid  MOM 30 ml per peg daily prn constipation  Vibegron 75 mg per peg daily  Rosuvastatin 5 mg per pegdaily  Meropenem 1 gram IV q 8 hours  Memantine 10 mg/ 5 ml  2.5 ml per peg  Plavix 75 mg per peg daily  Potassium chloride 20 meq/ 15 ml  15 ml per peg daily  Culturelle one capsule per peg daily  Spironolactone 25 mg / 5 ml 5 ml per peg daily  Vitamin B 12 1000 mcg per peg daily  Metolazone 5 mg 1/2 tablet per peg on Sat.  Vitamin C 1000 mg per peg daily  Humalog sliding scale insulin coverage  Lantus 40 units subcutaneous daily  Oxycodone 5 mg / 5 ml  20 ml per peg q 6 hours prn  Omeprazole 40 mg per peg daily  Metoprolol tartrate 50 mg per peg bid  Warfarin 3 mg per peg daily    Labs reviewed at facility:   Laboratory Service  Report [id number]   Patient Name   SYMBOL,JOHANA  Patient ID   [id number]  Age   87 Y  Gender   F  Order #      Ordering CATRINA Sherman  Patient Telephone #   N/A     1935  AKA      Client Order #   [id number]   Collection Date and Time   2023 07:45   Print Date and Time   2023 17:09  Account Information   [facility] [place]   [id number] [place] [address]     Report Notes                        Test Results   Reference Perform  Unit  Value Site*             CBC and Differential  REPORTED 2023 10:14  White Blood Cell Count   7.70 k/uL 3.70-11.00    RBC   3.91 m/uL 3.90-5.20    Hemoglobin   12.0 g/dL 11.5-15.5    Hematocrit   38.4 % 36.0-46.0    MCV   98.2 fL 80.0-100.0    MCH   30.7 pg 26.0-34.0    MCHC   31.3 g/dL 30.5-36.0    RDW-CV H 17.4 % 11.5-15.0    Platelet Count L 107 k/uL 150-400    Results may be inaccurate due to the presence of microclots.  MPV   9.9 fL 9.0-12.7    Neut%   59.8 %    Abs Neut   4.60 k/uL 1.45-7.50    Lymph%   20.0 %    Abs Lymph   1.54 k/uL 1.00-4.00    Mono%   10.1 %    Abs Mono   0.78 k/uL <0.87    Eosin%   7.8 %    Abs Eosin H 0.60 k/uL <0.46    Baso%   1.0 %    Abs Baso   0.08 k/uL <0.11    Immature Gran %%   1.3 %    Abs Immature Gran H 0.10 k/uL <0.10    Absolute nRBC   0.0 /100 WBC    Absolute nRBC   <0.01 k/uL <0.01    Diff Type   Auto               Comp Metabolic Panel  REPORTED 2023 10:31  Protein, Total L 5.6 g/dL 6.3-8.0    Albumin L 2.2 g/dL 3.9-4.9    Calcium, Total   10.1 mg/dL 8.5-10.2    Bilirubin, Total   0.3 mg/dL 0.2-1.3    Alkaline Phosphatase H 137 U/L     AST   35 U/L 13-35    ALT   14 U/L 7-38    Glucose H 157 mg/dL 74-99   BUN H 30 mg/dL 7-21    Creatinine   0.66 mg/dL 0.58-0.96    Sodium   143 mmol/L 136-144    Potassium   4.6 mmol/L 3.7-5.1    Chloride H 108 mmol/L     CO2   28 mmol/L 22-30    Anion Gap L 7 mmol/L 9-18    Estimated Glomerular Filtration Rate   86 mL/min/1.73 meters  squared >=60   Assessment/Plan    Problem List Items Addressed This Visit       Diabetes mellitus type 2, insulin dependent (CMS/[facility])     Review blood sugars. Blood sugar today 231. Continue with Lantus insulin 40 units subcutaneous daily. Humalog sliding scale insulin coverage.         Dementia without behavioral disturbance, psychotic disturbance, mood disturbance, or anxiety, unspecified dementia severity, unspecified dementia type (CMS/[facility])     Memantine 10 mg/ 5 ml  2.5 ml per peg         Aspiration pneumonia (CMS/[facility]) - Primary     NPO. Nutrition and medications per Corpak. Schedule repeat swallow evaluation.         Moderate protein malnutrition (CMS/[facility])     Diabetasource 1.2 via ng tube continuous at 67cc/hr via pump and use flush bag to administer a water flush at 25 cc/hr. Review labs. CMP CBC with diff q week.          Impaired functional mobility, balance, gait, and endurance     Order PT and OT to assess and treat.         Pain     Oxycodone 5 mg / 5 ml  20 ml per peg q 6 hours prn            Time:  I spent 45 minutes or greater with the patient. Greater than 50% of this time was spent in counseling and or coordination of care. The time includes prep time of reviewing vital signs, report from direct nursing staff and or therapists, hospital documentation, reviewing labs, radiographs, diagnostic tests and or consultations, time directly spent with the patient interviewing, examining, and education regarding diagnosis, treatments, and medications, as well as documentation in the electronic medical record, and reviewing the plan of care and any new orders with the patient, nursing staff and other staff directly related to the patients care.      Wing Ceja PA-C

## 2023-06-30 NOTE — PROGRESS NOTES
Subjective   Patient ID: Kayla Kasper is a 87 y.o. female who is acute skilled care and presents for initial visit for skilled nursing.    86-year-old female patient has been admitted third time, when I saw her last time and seen in the evening she developed shortness of breath, she continued to have a consolidative opacity in the right lower lobe which has been there for several weeks now, patient was found to have recurrent aspiration pneumonia, previously her modified barium swallow was not normal, at this time patient has n.p.o. status, they inserted nasogastric tube, family undecided about gastrostomy tube so simply they just put nasogastric tube and discharge patient, patient received cefepime therapy, patient's condition is looking worse, she is quite uncomfortable, her functional decline is progressive and persistent.  Patient has been suffering from morbid obesity, cognitive impairment, atrial fibrillation, has been on anticoagulation therapy, she has a COPD, pacemaker implant, sick sinus syndrome.  They found that she was having some sort of cystitis, there was a distention of urinary bladder with some hydroureter, patient has a decompression with a Lai catheter placement.  When I saw this patient there was no family member at bedside.  Patient continues to receive several therapeutics and now NG tube feeding continuously and also flushed with free water.  Patient's condition looks worse as mentioned some back here in the facility and I am not sure how well this patient will do during her stay here at this time and interestingly did discharge patient with Humulin 70/30 even though someone is on a continuous nasogastric tube feeding, discharge medication list has not been done properly, patient has extremely difficult mobility, patient is showing further deterioration in her functional status.  The discharge patient with meropenem therapy to be given till 3 July so today patient was assessed by me and  will be back here in skilled nursing and rehabilitation.  All other reports and information during hospitalization were reviewed, discharge summary was reviewed.         Review of Systems  onstitutional:  Positive for activity change, appetite change and fatigue. Negative for diaphoresis.   HENT:  Positive for trouble swallowing. Negative for congestion and tinnitus.    Eyes:  Negative for visual disturbance.   Respiratory:  Positive for cough and shortness of breath. Negative for apnea and choking.    Cardiovascular:  Positive for leg swelling.   Gastrointestinal:  Positive for diarrhea. Negative for abdominal distention, abdominal pain and blood in stool.   Genitourinary:  Positive for frequency. Negative for difficulty urinating and dysuria.   Musculoskeletal:  Positive for arthralgias, back pain and gait problem.   Allergic/Immunologic: Negative for immunocompromised state.   Neurological:  Positive for weakness. Negative for dizziness.   Hematological:  Bruises/bleeds easily.   Psychiatric/Behavioral:  Positive for sleep disturbance.      Objective   /86   Pulse 90   Wt 106 kg (233 lb)   BMI 42.62 kg/m²     Physical Exam  Constitutional:       Appearance: Normal appearance but uncomfortable. She is obese.   HENT:      Head: Normocephalic.      Nose: Nose normal.   Eyes:      Conjunctiva/sclera: Conjunctivae normal.   Cardiovascular:      Rate and Rhythm: Normal rate. Rhythm irregular.      Heart sounds: Normal heart sounds.   Pulmonary:      Effort: Pulmonary effort is normal.      Breath sounds: rales RLL.  Abdominal:      Palpations: Abdomen is soft. Lai present, Corpak present  Musculoskeletal:         General: Swelling and tenderness present.      Cervical back: Normal range of motion and neck supple.   Skin:     General: Skin is warm and dry.      Findings: Bruising present.   Neurological:      General: No focal deficit present.      Mental Status: She is not oriented to person, place, and  time. Mental status is at baseline.   Psychiatric:         Mood and Affect: Mood anxious, restless    Assessment/Plan   Problem List Items Addressed This Visit       CAD (coronary artery disease)    Cardiac pacemaker in situ    COPD (chronic obstructive pulmonary disease) (CMS/[facility])    Diabetes mellitus (CMS/[facility])    Dementia without behavioral disturbance, psychotic disturbance, mood disturbance, or anxiety, unspecified dementia severity, unspecified dementia type (CMS/[facility])     Other Visit Diagnoses       Pneumonia of both lungs due to infectious organism, unspecified part of lung    -  Primary    Hemorrhagic cystitis        Oropharyngeal dysphagia        Atrial fibrillation, unspecified type (CMS/[facility])            2 complicated medical problems now aspiration pneumonia is recurrent consolidative opacity will not improve that easily, meropenem will be given for duration of time through peripheral IV.  Patient is listed to be on meropenem, Elavil which will be stopped, clopidogrel, probiotics, Lasix, gabapentin, Humulin 70/30 which will be changed to Lantus insulin with 6 hourly blood sugar monitoring, Namenda, metolazone once a week, metoprolol, oxycodone which will be 20 mg every 4 hours which is extremely high dose of oxycodone but it will be given, pantoprazole, potassium chloride, rosuvastatin, spironolactone, Viibryd Lidia, warfarin.  PT/INR will be done and PT/INR are going to fluctuate significantly because of nasogastric tube feeding.  Dementia is progressive, breathing compromise persist, after 10 days we will attempt to do either modified barium swallow or family discussion will be carried out to consider gastrostomy tube because swallowing is not admitted to improve, physical therapy, Occupational Therapy, speech therapy evaluation will be done.  No angina, shortness of breath persist, cognitive impairment is intermittent and fluctuating, atrial fibrillation persist.  Patient's mobility remains severely  impaired, susceptible for pressure sores, susceptible for further aspiration with the nasogastric tube feeding, Lai catheter will not be removed.  Patient's prognosis is guarded, DNR CCA reviewed.  Would talk with family, last time we have a discussion with patient's daughters.  Do not see that patient is going to go home anytime.  High risk for hospitalization, diagnosis list were reviewed, plan of care was reviewed, excellent nursing care has been provided, head end of bed has to be kept elevated all the times, periodic laboratory assessment will be done, blood sugars will be monitored, notify physician for any significant decline in the clinical condition I told nursing staff.  We will continue to follow patient's clinical condition and prognosis remains guarded.     Goals    None

## 2023-07-02 PROBLEM — Z74.09 IMPAIRED FUNCTIONAL MOBILITY, BALANCE, GAIT, AND ENDURANCE: Status: ACTIVE | Noted: 2023-01-01

## 2023-07-02 PROBLEM — E44.0 MODERATE PROTEIN MALNUTRITION (MULTI): Status: ACTIVE | Noted: 2023-01-01

## 2023-07-02 PROBLEM — R52 PAIN: Status: ACTIVE | Noted: 2023-01-01

## 2023-07-02 PROBLEM — J69.0 ASPIRATION PNEUMONIA (MULTI): Status: ACTIVE | Noted: 2023-01-01

## 2023-07-02 PROBLEM — Z79.4 DIABETES MELLITUS TYPE 2, INSULIN DEPENDENT (MULTI): Status: ACTIVE | Noted: 2023-01-01

## 2023-07-02 NOTE — LETTER
Patient: Kayla Kasper  : 1935    Encounter Date: 2023    Subjective  Patient ID: Kayla Kasper is a 87 y.o. female who is acute skilled care being seen and evaluated for multiple medical problems.    87-year-old female patient was seen for follow-up.  Today patient's spouse was present at bedside.  Patient is much more calm and comfortable, there was x-ray of abdomen done, there was a some stool burden seen.  Patient continues to receive NG tube feeding, head end of bed has been kept elevated.  Patient is much more awake and alert, with the NG tube feeding we will not be able to obtain therapeutic INR on her, she is on oxycodone 15 mg every 6 hours and that is very constipating agent, patient has a multiple aspiration pneumonia, patient has a frequent hospitalization, patient has a consolidative opacity in the lower lobes.  Patient has history of diabetes, blood sugars are reviewed, patient is a heavyset female patient, patient has a Lai catheter which does not show any blood as nursing staff is telling me there was no blood.  Patient's laboratories has been done, patient condition was assessed, nursing evaluations were assessed.         Review of Systems  onstitutional:  Positive for activity change, appetite change and fatigue. Negative for diaphoresis.   HENT:  Positive for trouble swallowing. Negative for congestion and tinnitus.    Eyes:  Negative for visual disturbance.   Respiratory:  Positive for cough and shortness of breath. Negative for apnea and choking.    Cardiovascular:  Positive for leg swelling.   Gastrointestinal:  Positive for constipation. Negative for abdominal distention, abdominal pain and blood in stool.   Genitourinary:  Negative for difficulty urinating and dysuria.   Musculoskeletal:  Positive for arthralgias, back pain and gait problem.   Allergic/Immunologic: Negative for immunocompromised state.   Neurological:  Positive for weakness. Negative for dizziness.    Hematological:  Bruises/bleeds easily.   Objective  BP 95/70   Pulse 98     Physical Exam  Constitutional:       Appearance: Normal appearance but uncomfortable. She is obese.   HENT:      Head: Normocephalic.      Nose: Nose normal.   Eyes:      Conjunctiva/sclera: Conjunctivae normal.   Cardiovascular:      Rate and Rhythm: Normal rate. Rhythm irregular.      Heart sounds: Normal heart sounds.   Pulmonary:      Effort: Pulmonary effort is normal.      Breath sounds: rales RLL.  Abdominal:      Palpations: Abdomen is soft. Lai present, Corpak present  Musculoskeletal:         General: Swelling and tenderness present.      Cervical back: Normal range of motion and neck supple.   Skin:     General: Skin is warm and dry.      Findings: Bruising present.   Neurological:      General: No focal deficit present.      Mental Status: She is not oriented to person, place, and time. Mental status is at baseline.   Assessment/Plan  Problem List Items Addressed This Visit       CAD (coronary artery disease)    Dementia without behavioral disturbance, psychotic disturbance, mood disturbance, or anxiety, unspecified dementia severity, unspecified dementia type (CMS/[facility])    Aspiration pneumonia (CMS/[facility]) - Primary    Impaired functional mobility, balance, gait, and endurance     Other Visit Diagnoses       Pneumonia of both lungs due to infectious organism, unspecified part of lung        Type 2 diabetes mellitus with diabetic peripheral angiopathy without gangrene, with long-term current use of insulin (CMS/[facility])        Oropharyngeal dysphagia            Discussed with spouse at bedside, it will take some time for this patient to recover, it may require some effort, after about 7 to 9 days more modified barium swallow needs to be attempted.  That will be able to tell us that where patient's swallowing is going, if swallowing dysfunction still persist then we need gastrostomy tube, I communicated with the patient and patient's   about it, we cannot keep NG tube for long duration of time, she is much more calm, blood sugars are fluctuating, oxycodone is going to cause constipation, MiraLAX can be used every day, INR remains subtherapeutic, patient remains susceptible for pressure sores, patient remains susceptible for further aspiration pneumonia.  Told  that patient's prognosis could be guarded, limbs remains edematous, IV antibiotics to be continued, PICC line was inspected, anasarca persist, prognosis is guarded, periodic laboratory assessment and follow-up will be done.     Goals    None           Electronically Signed By: Johnnie Swanson MD   7/2/23 10:00 PM

## 2023-07-03 NOTE — ASSESSMENT & PLAN NOTE
Diabetasource 1.2 via ng tube continuous at 67cc/hr via pump and use flush bag to administer a water flush at 25 cc/hr. Review labs. CMP CBC with diff q week.

## 2023-07-03 NOTE — ASSESSMENT & PLAN NOTE
Review blood sugars. Blood sugar today 231. Continue with Lantus insulin 40 units subcutaneous daily. Humalog sliding scale insulin coverage.

## 2023-07-03 NOTE — PROGRESS NOTES
Subjective   Patient ID: Kayla Kasper is a 87 y.o. female who is acute skilled care being seen and evaluated for multiple medical problems.    87-year-old female patient was seen for follow-up.  Today patient's spouse was present at bedside.  Patient is much more calm and comfortable, there was x-ray of abdomen done, there was a some stool burden seen.  Patient continues to receive NG tube feeding, head end of bed has been kept elevated.  Patient is much more awake and alert, with the NG tube feeding we will not be able to obtain therapeutic INR on her, she is on oxycodone 15 mg every 6 hours and that is very constipating agent, patient has a multiple aspiration pneumonia, patient has a frequent hospitalization, patient has a consolidative opacity in the lower lobes.  Patient has history of diabetes, blood sugars are reviewed, patient is a heavyset female patient, patient has a Lai catheter which does not show any blood as nursing staff is telling me there was no blood.  Patient's laboratories has been done, patient condition was assessed, nursing evaluations were assessed.         Review of Systems  onstitutional:  Positive for activity change, appetite change and fatigue. Negative for diaphoresis.   HENT:  Positive for trouble swallowing. Negative for congestion and tinnitus.    Eyes:  Negative for visual disturbance.   Respiratory:  Positive for cough and shortness of breath. Negative for apnea and choking.    Cardiovascular:  Positive for leg swelling.   Gastrointestinal:  Positive for constipation. Negative for abdominal distention, abdominal pain and blood in stool.   Genitourinary:  Negative for difficulty urinating and dysuria.   Musculoskeletal:  Positive for arthralgias, back pain and gait problem.   Allergic/Immunologic: Negative for immunocompromised state.   Neurological:  Positive for weakness. Negative for dizziness.   Hematological:  Bruises/bleeds easily.   Objective   BP 95/70   Pulse 98      Physical Exam  Constitutional:       Appearance: Normal appearance but uncomfortable. She is obese.   HENT:      Head: Normocephalic.      Nose: Nose normal.   Eyes:      Conjunctiva/sclera: Conjunctivae normal.   Cardiovascular:      Rate and Rhythm: Normal rate. Rhythm irregular.      Heart sounds: Normal heart sounds.   Pulmonary:      Effort: Pulmonary effort is normal.      Breath sounds: rales RLL.  Abdominal:      Palpations: Abdomen is soft. Lai present, Corpak present  Musculoskeletal:         General: Swelling and tenderness present.      Cervical back: Normal range of motion and neck supple.   Skin:     General: Skin is warm and dry.      Findings: Bruising present.   Neurological:      General: No focal deficit present.      Mental Status: She is not oriented to person, place, and time. Mental status is at baseline.   Assessment/Plan   Problem List Items Addressed This Visit       CAD (coronary artery disease)    Dementia without behavioral disturbance, psychotic disturbance, mood disturbance, or anxiety, unspecified dementia severity, unspecified dementia type (CMS/[facility])    Aspiration pneumonia (CMS/[facility]) - Primary    Impaired functional mobility, balance, gait, and endurance     Other Visit Diagnoses       Pneumonia of both lungs due to infectious organism, unspecified part of lung        Type 2 diabetes mellitus with diabetic peripheral angiopathy without gangrene, with long-term current use of insulin (CMS/[facility])        Oropharyngeal dysphagia            Discussed with spouse at bedside, it will take some time for this patient to recover, it may require some effort, after about 7 to 9 days more modified barium swallow needs to be attempted.  That will be able to tell us that where patient's swallowing is going, if swallowing dysfunction still persist then we need gastrostomy tube, I communicated with the patient and patient's  about it, we cannot keep NG tube for long duration of time, she  is much more calm, blood sugars are fluctuating, oxycodone is going to cause constipation, MiraLAX can be used every day, INR remains subtherapeutic, patient remains susceptible for pressure sores, patient remains susceptible for further aspiration pneumonia.  Told  that patient's prognosis could be guarded, limbs remains edematous, IV antibiotics to be continued, PICC line was inspected, anasarca persist, prognosis is guarded, periodic laboratory assessment and follow-up will be done.     Goals    None

## 2023-07-13 NOTE — LETTER
Patient: Kayla Kasper  : 1935    Encounter Date: 2023    Subjective  Patient ID: Kayla Kasper is a 87 y.o. female who is acute skilled care being seen and evaluated for multiple medical problems.    87-year-old female patient has been back and forth to the hospital, I do not see any possibility for this patient to recover from this cycle, I do not see that this patient is going to go home anytime.  Once again admitted because of confusion disorientation, patient remains full code, patient came back with a percutaneous gastrostomy tube, patient continued to have a bilateral lower lobe infiltrates, patient came with the PICC line and meropenem treatment, came with Lai catheter, came with warfarin treatment, there was a daughter present at bedside, patient remains on oxycodone 20 mg in a liquid form every 4-6 hours.  Just miserable condition, very poor quality of life, frequent hospitalization, do not see any possibility of coming out from this cycle of periodic hospitalization and deteriorated physical condition.  All the hospitalization data and information were reviewed, she is having multiple bruises, pressure sores, patient has AFO boots on both lower extremities, multiple excoriations, she is unable to answer my questions.  So patient is here back with skilled nursing and rehabilitation and assessed by me.         Review of Systems  onstitutional:  Positive for activity change, appetite change and fatigue. Negative for diaphoresis.   HENT:  Positive for trouble swallowing. Negative for congestion and tinnitus.    Eyes:  Negative for visual disturbance.   Respiratory:  Positive for cough and shortness of breath. Negative for apnea and choking.    Cardiovascular:  Positive for leg swelling.   Gastrointestinal:  Positive for constipation. Negative for abdominal distention, abdominal pain and blood in stool.   Genitourinary:  Negative for difficulty urinating and dysuria.   Musculoskeletal:   Positive for arthralgias, back pain and gait problem.   Allergic/Immunologic: Negative for immunocompromised state.   Neurological:  Positive for weakness.  Objective  /56   Pulse 100     Physical Exam  Constitutional:       Appearance: Normal appearance but uncomfortable. She is obese.   HENT:      Head: Normocephalic.      Nose: Nose normal.   Eyes:      Conjunctiva/sclera: Conjunctivae normal.   Cardiovascular:      Rate and Rhythm: Normal rate. Rhythm irregular.      Heart sounds: Normal heart sounds.   Pulmonary:      Effort: Pulmonary effort is normal.      Breath sounds: rales RLL.  Abdominal:      Palpations: Abdomen is soft. Lai present, Corpak present  Musculoskeletal:         General: Swelling and tenderness present.      Cervical back: Normal range of motion and neck supple.   Skin:     General: Skin is warm and dry.      Findings: Bruising present.   Neurological:      General: No focal deficit present.      Mental Status: She is not oriented to person, place, and time.  Assessment/Plan  Problem List Items Addressed This Visit       CAD (coronary artery disease)    Diabetes mellitus type 2, insulin dependent (CMS/[facility])    Dementia without behavioral disturbance, psychotic disturbance, mood disturbance, or anxiety, unspecified dementia severity, unspecified dementia type (CMS/[facility])    Aspiration pneumonia (CMS/[facility]) - Primary     Other Visit Diagnoses       Type 2 diabetes mellitus with diabetic peripheral angiopathy without gangrene, with long-term current use of insulin (CMS/[facility])        Oropharyngeal dysphagia        Pneumonia of both lungs due to infectious organism, unspecified part of lung            Patient's condition looks better, would discuss with family in detail because there is no reason for this patient to Sustain like this.  Patient is listed to be on ascorbic acid, atorvastatin, clopidogrel, Lasix, gabapentin 1200 mg, Lantus insulin 40 units, Namenda liquid, nebulizers, metolazone  once a week, meropenem till 16 July, metoprolol, omeprazole, oxycodone, potassium chloride, Viibryd Lidia, warfarin.  There is a high likelihood that this patient will be back in the hospital the vein which things are going on.  Told daughter that patient's condition does not look any different.  Patient's prognosis is guarded, aspiration pneumonia is chronic, PEG tube was placed, not tolerating bolus feeding, please take her with abdominal binders, please consider discontinuing warfarin treatment, please consider palliative care, patient continued to have a bedbound status poor quality of life.  Patient has diabetes mellitus, poor functional status.  Her cognitive functions remains poor, Lai catheter cannot be taken off, lets see how this patient's condition permits but a couple of more hospitalizations should definitely consider serious discussion about quality of life and DNR and avoiding this frequent ER visits or hospitalization as conclusion is not going to be good about it.  Lets see how this patient's condition permits and will continue to follow patient's clinical state.     Goals    None           Electronically Signed By: Johnnie Swanson MD   7/15/23 11:49 PM

## 2023-07-16 NOTE — PROGRESS NOTES
Subjective   Patient ID: Kayla Kasper is a 87 y.o. female who is acute skilled care being seen and evaluated for multiple medical problems.    87-year-old female patient has been back and forth to the hospital, I do not see any possibility for this patient to recover from this cycle, I do not see that this patient is going to go home anytime.  Once again admitted because of confusion disorientation, patient remains full code, patient came back with a percutaneous gastrostomy tube, patient continued to have a bilateral lower lobe infiltrates, patient came with the PICC line and meropenem treatment, came with Lai catheter, came with warfarin treatment, there was a daughter present at bedside, patient remains on oxycodone 20 mg in a liquid form every 4-6 hours.  Just miserable condition, very poor quality of life, frequent hospitalization, do not see any possibility of coming out from this cycle of periodic hospitalization and deteriorated physical condition.  All the hospitalization data and information were reviewed, she is having multiple bruises, pressure sores, patient has AFO boots on both lower extremities, multiple excoriations, she is unable to answer my questions.  So patient is here back with skilled nursing and rehabilitation and assessed by me.         Review of Systems  onstitutional:  Positive for activity change, appetite change and fatigue. Negative for diaphoresis.   HENT:  Positive for trouble swallowing. Negative for congestion and tinnitus.    Eyes:  Negative for visual disturbance.   Respiratory:  Positive for cough and shortness of breath. Negative for apnea and choking.    Cardiovascular:  Positive for leg swelling.   Gastrointestinal:  Positive for constipation. Negative for abdominal distention, abdominal pain and blood in stool.   Genitourinary:  Negative for difficulty urinating and dysuria.   Musculoskeletal:  Positive for arthralgias, back pain and gait problem.    Allergic/Immunologic: Negative for immunocompromised state.   Neurological:  Positive for weakness.  Objective   /56   Pulse 100     Physical Exam  Constitutional:       Appearance: Normal appearance but uncomfortable. She is obese.   HENT:      Head: Normocephalic.      Nose: Nose normal.   Eyes:      Conjunctiva/sclera: Conjunctivae normal.   Cardiovascular:      Rate and Rhythm: Normal rate. Rhythm irregular.      Heart sounds: Normal heart sounds.   Pulmonary:      Effort: Pulmonary effort is normal.      Breath sounds: rales RLL.  Abdominal:      Palpations: Abdomen is soft. Lai present, Corpak present  Musculoskeletal:         General: Swelling and tenderness present.      Cervical back: Normal range of motion and neck supple.   Skin:     General: Skin is warm and dry.      Findings: Bruising present.   Neurological:      General: No focal deficit present.      Mental Status: She is not oriented to person, place, and time.  Assessment/Plan   Problem List Items Addressed This Visit       CAD (coronary artery disease)    Diabetes mellitus type 2, insulin dependent (CMS/[facility])    Dementia without behavioral disturbance, psychotic disturbance, mood disturbance, or anxiety, unspecified dementia severity, unspecified dementia type (CMS/[facility])    Aspiration pneumonia (CMS/[facility]) - Primary     Other Visit Diagnoses       Type 2 diabetes mellitus with diabetic peripheral angiopathy without gangrene, with long-term current use of insulin (CMS/[facility])        Oropharyngeal dysphagia        Pneumonia of both lungs due to infectious organism, unspecified part of lung            Patient's condition looks better, would discuss with family in detail because there is no reason for this patient to Sustain like this.  Patient is listed to be on ascorbic acid, atorvastatin, clopidogrel, Lasix, gabapentin 1200 mg, Lantus insulin 40 units, Namenda liquid, nebulizers, metolazone once a week, meropenem till 16 July, metoprolol,  omeprazole, oxycodone, potassium chloride, Viibryd Lidia, warfarin.  There is a high likelihood that this patient will be back in the hospital the vein which things are going on.  Told daughter that patient's condition does not look any different.  Patient's prognosis is guarded, aspiration pneumonia is chronic, PEG tube was placed, not tolerating bolus feeding, please take her with abdominal binders, please consider discontinuing warfarin treatment, please consider palliative care, patient continued to have a bedbound status poor quality of life.  Patient has diabetes mellitus, poor functional status.  Her cognitive functions remains poor, Lai catheter cannot be taken off, lets see how this patient's condition permits but a couple of more hospitalizations should definitely consider serious discussion about quality of life and DNR and avoiding this frequent ER visits or hospitalization as conclusion is not going to be good about it.  Lets see how this patient's condition permits and will continue to follow patient's clinical state.     Goals    None

## 2023-07-20 NOTE — LETTER
Patient: Kayla Kasper  : 1935    Encounter Date: 2023    Subjective  Patient ID: Kayla Kasper is a 87 y.o. female who is acute skilled care being seen and evaluated for multiple medical problems.    Once again this patient is back here in the facility after third admission, she comes here, she developed shortness of breath, she is hypoxic, she is obtunded, she has altered sensorium and then in the middle of the night she gets transferred to the hospital, every time she gets admitted with altered sensorium, centimeters infiltrate is seen pulmonary infiltrate never improves, she continued to have aspiration pneumonia, she continued to have gastrostomy tube feeding, she continued to have very poor quality of life, she has a catheter associated UTI, she received numerous courses of antibiotics and condition is not improving, patient used to be doing well about 6 to 8 months ago and last 3 months has been quite horrific to her, I have a chance to communicate with patient's  yesterday, I told  that do not expect this patient to do well in form of that she can be coming home and she can be walking and talking as usual.  On top of that her functional status has declined, her swallowing is dysfunctional, she has a hemorrhagic cystitis, she has a cognitive impairment, patient has history of atrial fibrillation, I am not sure that what is the purpose of warfarin therapy here, patient remains on clopidogrel and warfarin both.  Patient is back here in the facility, this time at least patient is a DO NOT RESUSCITATE.  When I saw this patient patient was complaining of pain and could not exactly say where was the pain,  was present at bedside, just very miserable condition this patient is going through so once again all the hospitalization data and information were reviewed by me.         Review of Systems  nstitutional:  Positive for activity change, appetite change and fatigue. Negative  for diaphoresis.   HENT:  Positive for trouble swallowing. Negative for congestion and tinnitus.    Eyes:  Negative for visual disturbance.   Respiratory:  Positive for cough and shortness of breath. Negative for apnea and choking.    Cardiovascular:  Positive for leg swelling.   Gastrointestinal:  Positive for constipation. Negative for abdominal distention, abdominal pain and blood in stool.   Genitourinary:  Negative for difficulty urinating and dysuria.   Musculoskeletal:  Positive for arthralgias, back pain and gait problem.   Allergic/Immunologic: Negative for immunocompromised state.   Neurological:  Positive for weakness.  Objective  /87   Pulse 88     Physical Exam  Constitutional:       Appearance: Normal appearance but uncomfortable. She is obese.   HENT:      Head: Normocephalic.      Nose: Nose normal.   Eyes:      Conjunctiva/sclera: Conjunctivae normal.   Cardiovascular:      Rate and Rhythm: Normal rate. Rhythm irregular.      Heart sounds: Normal heart sounds.   Pulmonary:      Effort: Pulmonary effort is normal.      Breath sounds: rales RLL.  Abdominal:      Palpations: Abdomen is soft. Lia present, Corpak present  Musculoskeletal:         General: Swelling and tenderness present.      Cervical back: Normal range of motion and neck supple.   Skin:     General: Skin is warm and dry.      Findings: Bruising present.   Neurological:      General: No focal deficit present.      Mental Status: She is not oriented to person, place, and time.  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  G-tube is present, abdominal binders are present, Lai is present, PICC line is present, patient is listed to be on atorvastatin, clopidogrel, warfarin, DuoNeb, furosemide 40 mg, gabapentin 800 mg 3 times a day, Lantus insulin 40 units, levofloxacin for 7 days, Namenda, metolazone half tablet which will be discontinued, oxycodone, omeprazole, potassium chloride, Viibryd Lidia, vitamin D3, no sort of antibiotics will be given, once again told patient and spouse that the patient is not going to do well and we need to think about palliative care, if things will not get worse then we may have to consider hospice care.  We can attempt a skilled nursing and rehabilitation and at my next evaluation I may discontinue metolazone and also cut down Lasix, laboratories will be done as per our routine but patient's prognosis is guarded, pneumonia is recurrent, UTI is recurrent, cognitive impairment is going to be worsening, opioids has been given at the high dose to keep her comfortable, she has a chronic pain of uncertain significance.  Patient's prognosis is guarded, periodic assessment and follow-up will be done and unfortunately remains at high risk of frequent hospitalization and frequent hospitalization does not improve patient's clinical condition.     Goals    None           Electronically Signed By: Johnnie Swanson MD   7/20/23  8:08 PM

## 2023-07-21 NOTE — PROGRESS NOTES
Subjective   Patient ID: Kayla Kasper is a 87 y.o. female who is acute skilled care being seen and evaluated for multiple medical problems.    Once again this patient is back here in the facility after third admission, she comes here, she developed shortness of breath, she is hypoxic, she is obtunded, she has altered sensorium and then in the middle of the night she gets transferred to the hospital, every time she gets admitted with altered sensorium, centimeters infiltrate is seen pulmonary infiltrate never improves, she continued to have aspiration pneumonia, she continued to have gastrostomy tube feeding, she continued to have very poor quality of life, she has a catheter associated UTI, she received numerous courses of antibiotics and condition is not improving, patient used to be doing well about 6 to 8 months ago and last 3 months has been quite horrific to her, I have a chance to communicate with patient's  yesterday, I told  that do not expect this patient to do well in form of that she can be coming home and she can be walking and talking as usual.  On top of that her functional status has declined, her swallowing is dysfunctional, she has a hemorrhagic cystitis, she has a cognitive impairment, patient has history of atrial fibrillation, I am not sure that what is the purpose of warfarin therapy here, patient remains on clopidogrel and warfarin both.  Patient is back here in the facility, this time at least patient is a DO NOT RESUSCITATE.  When I saw this patient patient was complaining of pain and could not exactly say where was the pain,  was present at bedside, just very miserable condition this patient is going through so once again all the hospitalization data and information were reviewed by me.         Review of Systems  nstitutional:  Positive for activity change, appetite change and fatigue. Negative for diaphoresis.   HENT:  Positive for trouble swallowing. Negative for  congestion and tinnitus.    Eyes:  Negative for visual disturbance.   Respiratory:  Positive for cough and shortness of breath. Negative for apnea and choking.    Cardiovascular:  Positive for leg swelling.   Gastrointestinal:  Positive for constipation. Negative for abdominal distention, abdominal pain and blood in stool.   Genitourinary:  Negative for difficulty urinating and dysuria.   Musculoskeletal:  Positive for arthralgias, back pain and gait problem.   Allergic/Immunologic: Negative for immunocompromised state.   Neurological:  Positive for weakness.  Objective   /87   Pulse 88     Physical Exam  Constitutional:       Appearance: Normal appearance but uncomfortable. She is obese.   HENT:      Head: Normocephalic.      Nose: Nose normal.   Eyes:      Conjunctiva/sclera: Conjunctivae normal.   Cardiovascular:      Rate and Rhythm: Normal rate. Rhythm irregular.      Heart sounds: Normal heart sounds.   Pulmonary:      Effort: Pulmonary effort is normal.      Breath sounds: rales RLL.  Abdominal:      Palpations: Abdomen is soft. Lai present, Corpak present  Musculoskeletal:         General: Swelling and tenderness present.      Cervical back: Normal range of motion and neck supple.   Skin:     General: Skin is warm and dry.      Findings: Bruising present.   Neurological:      General: No focal deficit present.      Mental Status: She is not oriented to person, place, and time.  Assessment/Plan   Problem List Items Addressed This Visit       Diabetes mellitus type 2, insulin dependent (CMS/HCC)    Dementia without behavioral disturbance, psychotic disturbance, mood disturbance, or anxiety, unspecified dementia severity, unspecified dementia type (CMS/HCC)    Aspiration pneumonia (CMS/HCC) - Primary    Impaired functional mobility, balance, gait, and endurance     Other Visit Diagnoses       Type 2 diabetes mellitus with diabetic peripheral angiopathy without gangrene, with long-term current use of  insulin (CMS/[facility])        Oropharyngeal dysphagia        Atrial fibrillation, unspecified type (CMS/[facility])            Patient's condition was assessed, she has a bedsores, she is quite dysfunctional, she is obtunded, she is not aware about surroundings.  G-tube is present, abdominal binders are present, Lai is present, PICC line is present, patient is listed to be on atorvastatin, clopidogrel, warfarin, DuoNeb, furosemide 40 mg, gabapentin 800 mg 3 times a day, Lantus insulin 40 units, levofloxacin for 7 days, Namenda, metolazone half tablet which will be discontinued, oxycodone, omeprazole, potassium chloride, Viibryd Lidia, vitamin D3, no sort of antibiotics will be given, once again told patient and spouse that the patient is not going to do well and we need to think about palliative care, if things will not get worse then we may have to consider hospice care.  We can attempt a skilled nursing and rehabilitation and at my next evaluation I may discontinue metolazone and also cut down Lasix, laboratories will be done as per our routine but patient's prognosis is guarded, pneumonia is recurrent, UTI is recurrent, cognitive impairment is going to be worsening, opioids has been given at the high dose to keep her comfortable, she has a chronic pain of uncertain significance.  Patient's prognosis is guarded, periodic assessment and follow-up will be done and unfortunately remains at high risk of frequent hospitalization and frequent hospitalization does not improve patient's clinical condition.     Goals    None

## 2023-07-21 NOTE — TELEPHONE ENCOUNTER
Is she to be on a maintenance dose of antibiotic for UTIs? Please advise and needs order to [facility].

## 2023-08-02 NOTE — LETTER
Patient: Kayla Kasper  : 1935    Encounter Date: 2023    Subjective  Patient ID: Kayla Kasper is a 87 y.o. female who is acute skilled care being seen and evaluated for multiple medical problems.    This is 1/6 time this patient has been admitted from acute care.  All the times and pulmonary infiltrates were seen, all the time treated for pneumonia, patient continued to have a chronic infiltrates which never improves, it is not a new pneumonia.  I was told that patient was having vaginal bleeding, I told him to wait but then bleeding continued as per nursing communication so patient was sent to emergency room, document says that it was a GI bleeding, nothing was done, hemoglobin was 9.3, very poor quality of life this patient is manifesting.  Patient has no activity or no communication very bad cognitive functions, remains on warfarin and do not know what is the significance of it, patient has old stroke, CAD, gastrostomy tube, Lai catheter, dementia, unable to comprehend or understand.  When I saw this patient patient's spouse was present at bedside, patient remains on oxycodone high-dose.  Patient has excoriations, pressure sores, patient's mentations are not appropriate, multiple test or investigations done and none of these things has been improving patient's clinical condition or quality of life, every hospitalization actually make her worse.  So now patient is DNR CCA, there is no way that this patient's condition is improving and unfortunately frequent hospitalization needs to be avoided because that is actually making this patient's condition deteriorated.  Have communication with patient's spouse this time, have talked with the spouse last time also.  All other reports and information were reviewed.         Review of Systems  Constitutional:  Positive for activity change, appetite change and fatigue. Negative for diaphoresis.   HENT:  Positive for trouble swallowing. Negative for  congestion and tinnitus.    Eyes:  Negative for visual disturbance.   Respiratory:  Positive for cough and shortness of breath. Negative for apnea and choking.    Cardiovascular:  Positive for leg swelling.   Gastrointestinal:  Positive for constipation. Negative for abdominal distention, abdominal pain and blood in stool.   Genitourinary:  Negative for difficulty urinating and dysuria.   Musculoskeletal:  Positive for arthralgias, back pain   Allergic/Immunologic: Negative for immunocompromised state.   Neurological:  Positive for weakness.  Objective   Objective  /68   Pulse 88     Physical Exam  Constitutional:       Appearance: Normal appearance but uncomfortable. She is obese.   HENT:      Head: Normocephalic.      Nose: Nose normal.   Eyes:      Conjunctiva/sclera: Conjunctivae normal.   Cardiovascular:      Rate and Rhythm: Normal rate. Rhythm irregular.      Heart sounds: Normal heart sounds.   Pulmonary:      Effort: Pulmonary effort is normal.      Breath sounds: rales RLL.  Abdominal:      Palpations: Abdomen is soft. Lai present, Corpak present  Musculoskeletal:         General: Swelling and tenderness present.      Cervical back: Normal range of motion and neck supple.   Skin:     General: Skin is warm and dry.      Findings: Bruising present.   Neurological:      General: No focal deficit present.      Mental Status: She is not oriented to person, place, and time.  Assessment/Plan  Problem List Items Addressed This Visit       CAD (coronary artery disease)    Dementia without behavioral disturbance, psychotic disturbance, mood disturbance, or anxiety, unspecified dementia severity, unspecified dementia type (CMS/[facility])    Aspiration pneumonia (CMS/[facility]) - Primary    Impaired functional mobility, balance, gait, and endurance    Pain     Other Visit Diagnoses       Type 2 diabetes mellitus with diabetic peripheral angiopathy without gangrene, with long-term current use of insulin (CMS/[facility])         Atrial fibrillation, unspecified type (CMS/HCC)        Oropharyngeal dysphagia            Patient is not looking well, she has no comprehension, she is not having any executive functions, G-tube is going well, frequent change of position has to be done, levofloxacin will be given for prophylactic purpose 3 times a week, gastrostomy tube and Lai catheter is not coming out.  Patient is listed to be on atorvastatin, clopidogrel, probiotics, gabapentin 3 times a day, nebulizer, Lantus insulin 40 units, Lidoderm patch, Namenda, metoprolol, omeprazole, Viibryd Lidia, oxycodone, warfarin.  We will discontinue ibuprofen and warfarin, normal PT/INR's.  We will cut down the dose of gabapentin because it may cause combativeness or even lethargy.  Patient's prognosis is poor, talked with , told that patient is not going to do well, expectation is to be minimum, we need to have a realization about what is happening and what could happen I told spouse.  We will do routine laboratories, gastrostomy tube will be checked and feeding will be continued.  DNR CCA is appropriate.  We will try to minimize hospitalization but sometimes cannot help it in the middle of the night.  Chronic infiltrates are going to be there, periodic assessment and follow-up will be done, pain control will be kept at maximum with oxycodone treatment.  Will try to avoid any antipsychotics, agitation and restlessness are expected.     Goals    None           Electronically Signed By: Johnnie Swanson MD   8/3/23 10:07 PM

## 2023-08-04 NOTE — PROGRESS NOTES
Subjective   Patient ID: Kayla Kasper is a 87 y.o. female who is acute skilled care being seen and evaluated for multiple medical problems.    This is 1/6 time this patient has been admitted from acute care.  All the times and pulmonary infiltrates were seen, all the time treated for pneumonia, patient continued to have a chronic infiltrates which never improves, it is not a new pneumonia.  I was told that patient was having vaginal bleeding, I told him to wait but then bleeding continued as per nursing communication so patient was sent to emergency room, document says that it was a GI bleeding, nothing was done, hemoglobin was 9.3, very poor quality of life this patient is manifesting.  Patient has no activity or no communication very bad cognitive functions, remains on warfarin and do not know what is the significance of it, patient has old stroke, CAD, gastrostomy tube, Lai catheter, dementia, unable to comprehend or understand.  When I saw this patient patient's spouse was present at bedside, patient remains on oxycodone high-dose.  Patient has excoriations, pressure sores, patient's mentations are not appropriate, multiple test or investigations done and none of these things has been improving patient's clinical condition or quality of life, every hospitalization actually make her worse.  So now patient is DNR CCA, there is no way that this patient's condition is improving and unfortunately frequent hospitalization needs to be avoided because that is actually making this patient's condition deteriorated.  Have communication with patient's spouse this time, have talked with the spouse last time also.  All other reports and information were reviewed.         Review of Systems  Constitutional:  Positive for activity change, appetite change and fatigue. Negative for diaphoresis.   HENT:  Positive for trouble swallowing. Negative for congestion and tinnitus.    Eyes:  Negative for visual disturbance.    Respiratory:  Positive for cough and shortness of breath. Negative for apnea and choking.    Cardiovascular:  Positive for leg swelling.   Gastrointestinal:  Positive for constipation. Negative for abdominal distention, abdominal pain and blood in stool.   Genitourinary:  Negative for difficulty urinating and dysuria.   Musculoskeletal:  Positive for arthralgias, back pain   Allergic/Immunologic: Negative for immunocompromised state.   Neurological:  Positive for weakness.  Objective   Objective   /68   Pulse 88     Physical Exam  Constitutional:       Appearance: Normal appearance but uncomfortable. She is obese.   HENT:      Head: Normocephalic.      Nose: Nose normal.   Eyes:      Conjunctiva/sclera: Conjunctivae normal.   Cardiovascular:      Rate and Rhythm: Normal rate. Rhythm irregular.      Heart sounds: Normal heart sounds.   Pulmonary:      Effort: Pulmonary effort is normal.      Breath sounds: rales RLL.  Abdominal:      Palpations: Abdomen is soft. Lai present, Corpak present  Musculoskeletal:         General: Swelling and tenderness present.      Cervical back: Normal range of motion and neck supple.   Skin:     General: Skin is warm and dry.      Findings: Bruising present.   Neurological:      General: No focal deficit present.      Mental Status: She is not oriented to person, place, and time.  Assessment/Plan   Problem List Items Addressed This Visit       CAD (coronary artery disease)    Dementia without behavioral disturbance, psychotic disturbance, mood disturbance, or anxiety, unspecified dementia severity, unspecified dementia type (CMS/[facility])    Aspiration pneumonia (CMS/[facility]) - Primary    Impaired functional mobility, balance, gait, and endurance    Pain     Other Visit Diagnoses       Type 2 diabetes mellitus with diabetic peripheral angiopathy without gangrene, with long-term current use of insulin (CMS/[facility])        Atrial fibrillation, unspecified type (CMS/[facility])         Oropharyngeal dysphagia            Patient is not looking well, she has no comprehension, she is not having any executive functions, G-tube is going well, frequent change of position has to be done, levofloxacin will be given for prophylactic purpose 3 times a week, gastrostomy tube and Lai catheter is not coming out.  Patient is listed to be on atorvastatin, clopidogrel, probiotics, gabapentin 3 times a day, nebulizer, Lantus insulin 40 units, Lidoderm patch, Namenda, metoprolol, omeprazole, Viibryd Lidia, oxycodone, warfarin.  We will discontinue ibuprofen and warfarin, normal PT/INR's.  We will cut down the dose of gabapentin because it may cause combativeness or even lethargy.  Patient's prognosis is poor, talked with , told that patient is not going to do well, expectation is to be minimum, we need to have a realization about what is happening and what could happen I told spouse.  We will do routine laboratories, gastrostomy tube will be checked and feeding will be continued.  DNR CCA is appropriate.  We will try to minimize hospitalization but sometimes cannot help it in the middle of the night.  Chronic infiltrates are going to be there, periodic assessment and follow-up will be done, pain control will be kept at maximum with oxycodone treatment.  Will try to avoid any antipsychotics, agitation and restlessness are expected.     Goals    None

## 2023-08-09 NOTE — LETTER
Patient: Kayla Kasper  : 1935    Encounter Date: 2023    Subjective  Patient ID: Kayla Kasper is a 87 y.o. female who is acute skilled care being seen and evaluated for multiple medical problems.    Patient's condition was assessed in the presence of spouse, I have told patient's spouse very specifically about patient's condition and there is no chance of recovery, patient will never be able to walk out of this facility, more and more hospitalization is making more and more complicated situation for this patient to recover, there was a second question about oxycodone therapy and we thought that dosage needs to be reduced, patient is calm and comfortable, she has no orientation, chronic aspiration pneumonia is not improving, patient is admitted at least 7 times, she has not been out of the bed, gastrostomy tube feeding is not going anywhere, in between she will try to pull out the gastrostomy tube, binders has been kept on, patient has coronary artery disease, cognitive impairment, poor general condition, recurrent infections, Lai catheter, susceptibility to have a bedsores, recurrent aspiration pneumonia, confusion disorientation, fluctuating mental status, repeatedly we have tried our best to keep this patient, but somehow 1 or another problem happens to the point that patient cannot be kept here for good duration of time to see whether there is any improvement in the patient's physical functioning happening or not.  It is unfortunate and sad to see that we cannot help to improve this patient's condition.  This was very much made clear to the spouse at bedside today.         Review of Systems  ROS done and all sympts are negative except pertaining positive in HPI   Objective  /70   Pulse 89   Wt 97.1 kg (214 lb)   BMI 34.54 kg/m²     Physical Exam  Constitutional:       Appearance: Normal appearance but uncomfortable. She is obese.   HENT:      Head: Normocephalic.      Nose: Nose  normal.   Eyes:      Conjunctiva/sclera: Conjunctivae normal.   Cardiovascular:      Rate and Rhythm: Normal rate. Rhythm irregular.      Heart sounds: Normal heart sounds.   Pulmonary:      Effort: Pulmonary effort is normal.      Breath sounds: rales RLL.  Abdominal:      Palpations: Abdomen is soft. Lai present, Corpak present  Musculoskeletal:         General: Swelling and tenderness present.      Cervical back: Normal range of motion and neck supple.   Skin:     General: Skin is warm and dry.      Findings: Bruising present.   Neurological:      General: No focal deficit present.      Mental Status: She is not oriented to person, place, and time.  Assessment/Plan  Problem List Items Addressed This Visit       Diabetes mellitus type 2, insulin dependent (CMS/HCC)    Dementia without behavioral disturbance, psychotic disturbance, mood disturbance, or anxiety, unspecified dementia severity, unspecified dementia type (CMS/HCC)    Aspiration pneumonia (CMS/[facility]) - Primary    Impaired functional mobility, balance, gait, and endurance    Pain     Other Visit Diagnoses       Type 2 diabetes mellitus with diabetic peripheral angiopathy without gangrene, with long-term current use of insulin (CMS/[facility])        Atrial fibrillation, unspecified type (CMS/[facility])        Oropharyngeal dysphagia            Medical diagnoses were reviewed, aspiration pneumonia is chronic, oxycodone will be reduced to 10 mg, functional mobility and impairment is not going to recover, cognitive impairment will be worsening, insulin therapy was reviewed, anticoagulation has been withdrawn, patient is going to pull out gastrostomy tube so binders needs to be kept on, patient's prognosis is poor, DNR CCA has been applied at least.  There is no way that any recovery or improvement is happening.  Patient's spouse is seems to understood the situation and complexity of the problem.  We hope we can avoid any ER visits or hospitalization but sometimes it is  not possible.  Periodic assessment and follow-up will be done and discussed with the patient's spouse and nursing staff at bedside.  Prognosis is guarded.     Goals    None           Electronically Signed By: Johnnie Swanson MD   8/13/23  5:41 PM

## 2023-08-13 NOTE — PROGRESS NOTES
Subjective   Patient ID: Kayla Kasper is a 87 y.o. female who is acute skilled care being seen and evaluated for multiple medical problems.    Patient's condition was assessed in the presence of spouse, I have told patient's spouse very specifically about patient's condition and there is no chance of recovery, patient will never be able to walk out of this facility, more and more hospitalization is making more and more complicated situation for this patient to recover, there was a second question about oxycodone therapy and we thought that dosage needs to be reduced, patient is calm and comfortable, she has no orientation, chronic aspiration pneumonia is not improving, patient is admitted at least 7 times, she has not been out of the bed, gastrostomy tube feeding is not going anywhere, in between she will try to pull out the gastrostomy tube, binders has been kept on, patient has coronary artery disease, cognitive impairment, poor general condition, recurrent infections, Lai catheter, susceptibility to have a bedsores, recurrent aspiration pneumonia, confusion disorientation, fluctuating mental status, repeatedly we have tried our best to keep this patient, but somehow 1 or another problem happens to the point that patient cannot be kept here for good duration of time to see whether there is any improvement in the patient's physical functioning happening or not.  It is unfortunate and sad to see that we cannot help to improve this patient's condition.  This was very much made clear to the spouse at bedside today.         Review of Systems  ROS done and all sympts are negative except pertaining positive in HPI   Objective   /70   Pulse 89   Wt 97.1 kg (214 lb)   BMI 34.54 kg/m²     Physical Exam  Constitutional:       Appearance: Normal appearance but uncomfortable. She is obese.   HENT:      Head: Normocephalic.      Nose: Nose normal.   Eyes:      Conjunctiva/sclera: Conjunctivae normal.    Cardiovascular:      Rate and Rhythm: Normal rate. Rhythm irregular.      Heart sounds: Normal heart sounds.   Pulmonary:      Effort: Pulmonary effort is normal.      Breath sounds: rales RLL.  Abdominal:      Palpations: Abdomen is soft. Lai present, Corpak present  Musculoskeletal:         General: Swelling and tenderness present.      Cervical back: Normal range of motion and neck supple.   Skin:     General: Skin is warm and dry.      Findings: Bruising present.   Neurological:      General: No focal deficit present.      Mental Status: She is not oriented to person, place, and time.  Assessment/Plan   Problem List Items Addressed This Visit       Diabetes mellitus type 2, insulin dependent (CMS/HCC)    Dementia without behavioral disturbance, psychotic disturbance, mood disturbance, or anxiety, unspecified dementia severity, unspecified dementia type (CMS/HCC)    Aspiration pneumonia (CMS/HCC) - Primary    Impaired functional mobility, balance, gait, and endurance    Pain     Other Visit Diagnoses       Type 2 diabetes mellitus with diabetic peripheral angiopathy without gangrene, with long-term current use of insulin (CMS/[facility])        Atrial fibrillation, unspecified type (CMS/HCC)        Oropharyngeal dysphagia            Medical diagnoses were reviewed, aspiration pneumonia is chronic, oxycodone will be reduced to 10 mg, functional mobility and impairment is not going to recover, cognitive impairment will be worsening, insulin therapy was reviewed, anticoagulation has been withdrawn, patient is going to pull out gastrostomy tube so binders needs to be kept on, patient's prognosis is poor, DNR CCA has been applied at least.  There is no way that any recovery or improvement is happening.  Patient's spouse is seems to understood the situation and complexity of the problem.  We hope we can avoid any ER visits or hospitalization but sometimes it is not possible.  Periodic assessment and follow-up will be done  and discussed with the patient's spouse and nursing staff at bedside.  Prognosis is guarded.     Goals    None

## 2023-08-17 NOTE — LETTER
Patient: Kayla Kasper  : 1935    Encounter Date: 2023    Subjective  Patient ID: Kayla Kasper is a 87 y.o. female who is acute skilled care being seen and evaluated for multiple medical problems.    87-year-old female patient is admitted once again, her G-tube was malfunctioning, they ended up removing G-tube feeding and patient passed a swallowing test and patient is able to have a modified dysphagia diet, patient is back in the facility, once again I would like to mention that we are unable to prevent frequent hospitalization on this patient and also I have told the patient's  about the poor condition and no chances of recovery or improvement but he kept on insisting for acute care transfer, this time during hospitalization ethical committee was involved about a palliative care or comfort measures.  The patient is back here in the facility, today when I see this patient patient's spouse and other family member at bedside.  Patient has ongoing problems with recurrent hospitalization G-tube malfunction, aspiration pneumonia, bleeding from rectum, confusion, disorientation, chronic pain, history of atrial fibrillation, off anticoagulation, poor functional status, inability to ambulate, frequent infections, chronic Lai catheter, seventh hospitalization recently, fluid overload, pleural effusions, fluctuating renal functions.  Multiple comorbidities and complications are are not preventable, patient is back here in the facility at least 1 good thing happened that patient is able to eat by mouth NG tube feeding has been gone         Review of Systems  Constitutional:  Positive for activity change, appetite change and fatigue. Negative for diaphoresis.   HENT:improved trouble swallowing. Negative for congestion and tinnitus.    Eyes:  Negative for visual disturbance.   Respiratory:  Positive for cough and shortness of breath. Negative for apnea and choking.    Cardiovascular:  Positive for  leg swelling.   Gastrointestinal:  Positive for constipation. Negative for abdominal distention, abdominal pain and blood in stool.   Genitourinary:  Negative for difficulty urinating and dysuria.   Musculoskeletal:  Positive for arthralgias, back pain   Allergic/Immunologic: Negative for immunocompromised state.   Neurological:  Positive for weakness.  Objective  BP 98/68   Pulse 88     Physical Exam  Constitutional:       Appearance: Normal appearance but uncomfortable. She is obese.   HENT:      Head: Normocephalic.      Nose: Nose normal.   Eyes:      Conjunctiva/sclera: Conjunctivae normal.   Cardiovascular:      Rate and Rhythm: Normal rate. Rhythm irregular.      Heart sounds: Normal heart sounds.   Pulmonary:      Effort: Pulmonary effort is normal.      Breath sounds: rales RLL.  Abdominal:      Palpations: Abdomen is soft. Lai present  Musculoskeletal:         General: Swelling and tenderness present.      Cervical back: Normal range of motion and neck supple.   Skin:     General: Skin is warm and dry.      Findings: Bruising present.   Neurological:      General: No focal deficit present.      Mental Status: She is not oriented to person, place, and time.  Assessment/Plan  Problem List Items Addressed This Visit       CAD (coronary artery disease)    Diabetes mellitus type 2, insulin dependent (CMS/HCC)    Dementia without behavioral disturbance, psychotic disturbance, mood disturbance, or anxiety, unspecified dementia severity, unspecified dementia type (CMS/HCC)    Aspiration pneumonia (CMS/HCC) - Primary    Impaired functional mobility, balance, gait, and endurance    Pain     Other Visit Diagnoses       Type 2 diabetes mellitus with diabetic peripheral angiopathy without gangrene, with long-term current use of insulin (CMS/HCC)        Atrial fibrillation, unspecified type (CMS/HCC)            Discussed with spouse at bedside, told him that patient is going to be like this, do not expect any  improvement, do not expect that this patient is going to go home, she has multiple excoriations, has pressure sores, patient is back on the same therapeutics which includes lorazepam as needed, atorvastatin, clopidogrel, gabapentin which has been reduced to 400 mg 3 times a day, nebulizer, Lantus insulin 10 units, memantine, metoprolol, omeprazole, oxycodone has been reduced to 5 mg, Zyprexa 5 mg.  Patient's prognosis is poor I would say once again, patient remains susceptible for complications including frequent hospitalization infections and perhaps patient could succumb out of all these problems.  Patient's spouse was informed about it, Lai catheter could be tried to be removed later on, lets see that patient can maintain her physical state here at the facility rather than frequent ER visits and hospitalization.  Frequent ER visits and hospitalization does not improve patient's physical health seems like.  Narcotics and benzodiazepine therapy is a bad combination but unfortunately we have to provide dose combination because we are looking for comfort measures and symptom management.  Periodic assessment and follow-up will be done, blood sugars will be monitored, laboratories will be done, prognosis remains guarded, DNR CCA has been done and reviewed.     Goals    None           Electronically Signed By: Johnnie Swanson MD   8/18/23  9:07 PM

## 2023-08-19 NOTE — PROGRESS NOTES
Subjective   Patient ID: Kayla Kasper is a 87 y.o. female who is acute skilled care being seen and evaluated for multiple medical problems.    87-year-old female patient is admitted once again, her G-tube was malfunctioning, they ended up removing G-tube feeding and patient passed a swallowing test and patient is able to have a modified dysphagia diet, patient is back in the facility, once again I would like to mention that we are unable to prevent frequent hospitalization on this patient and also I have told the patient's  about the poor condition and no chances of recovery or improvement but he kept on insisting for acute care transfer, this time during hospitalization ethical committee was involved about a palliative care or comfort measures.  The patient is back here in the facility, today when I see this patient patient's spouse and other family member at bedside.  Patient has ongoing problems with recurrent hospitalization G-tube malfunction, aspiration pneumonia, bleeding from rectum, confusion, disorientation, chronic pain, history of atrial fibrillation, off anticoagulation, poor functional status, inability to ambulate, frequent infections, chronic Lai catheter, seventh hospitalization recently, fluid overload, pleural effusions, fluctuating renal functions.  Multiple comorbidities and complications are are not preventable, patient is back here in the facility at least 1 good thing happened that patient is able to eat by mouth NG tube feeding has been gone         Review of Systems  Constitutional:  Positive for activity change, appetite change and fatigue. Negative for diaphoresis.   HENT:improved trouble swallowing. Negative for congestion and tinnitus.    Eyes:  Negative for visual disturbance.   Respiratory:  Positive for cough and shortness of breath. Negative for apnea and choking.    Cardiovascular:  Positive for leg swelling.   Gastrointestinal:  Positive for constipation. Negative for  abdominal distention, abdominal pain and blood in stool.   Genitourinary:  Negative for difficulty urinating and dysuria.   Musculoskeletal:  Positive for arthralgias, back pain   Allergic/Immunologic: Negative for immunocompromised state.   Neurological:  Positive for weakness.  Objective   BP 98/68   Pulse 88     Physical Exam  Constitutional:       Appearance: Normal appearance but uncomfortable. She is obese.   HENT:      Head: Normocephalic.      Nose: Nose normal.   Eyes:      Conjunctiva/sclera: Conjunctivae normal.   Cardiovascular:      Rate and Rhythm: Normal rate. Rhythm irregular.      Heart sounds: Normal heart sounds.   Pulmonary:      Effort: Pulmonary effort is normal.      Breath sounds: rales RLL.  Abdominal:      Palpations: Abdomen is soft. Lai present  Musculoskeletal:         General: Swelling and tenderness present.      Cervical back: Normal range of motion and neck supple.   Skin:     General: Skin is warm and dry.      Findings: Bruising present.   Neurological:      General: No focal deficit present.      Mental Status: She is not oriented to person, place, and time.  Assessment/Plan   Problem List Items Addressed This Visit       CAD (coronary artery disease)    Diabetes mellitus type 2, insulin dependent (CMS/HCC)    Dementia without behavioral disturbance, psychotic disturbance, mood disturbance, or anxiety, unspecified dementia severity, unspecified dementia type (CMS/HCC)    Aspiration pneumonia (CMS/HCC) - Primary    Impaired functional mobility, balance, gait, and endurance    Pain     Other Visit Diagnoses       Type 2 diabetes mellitus with diabetic peripheral angiopathy without gangrene, with long-term current use of insulin (CMS/HCC)        Atrial fibrillation, unspecified type (CMS/HCC)            Discussed with spouse at bedside, told him that patient is going to be like this, do not expect any improvement, do not expect that this patient is going to go home, she has  multiple excoriations, has pressure sores, patient is back on the same therapeutics which includes lorazepam as needed, atorvastatin, clopidogrel, gabapentin which has been reduced to 400 mg 3 times a day, nebulizer, Lantus insulin 10 units, memantine, metoprolol, omeprazole, oxycodone has been reduced to 5 mg, Zyprexa 5 mg.  Patient's prognosis is poor I would say once again, patient remains susceptible for complications including frequent hospitalization infections and perhaps patient could succumb out of all these problems.  Patient's spouse was informed about it, Lai catheter could be tried to be removed later on, lets see that patient can maintain her physical state here at the facility rather than frequent ER visits and hospitalization.  Frequent ER visits and hospitalization does not improve patient's physical health seems like.  Narcotics and benzodiazepine therapy is a bad combination but unfortunately we have to provide dose combination because we are looking for comfort measures and symptom management.  Periodic assessment and follow-up will be done, blood sugars will be monitored, laboratories will be done, prognosis remains guarded, DNR CCA has been done and reviewed.     Goals    None

## 2023-09-06 NOTE — LETTER
Patient: Kayla Kasper  : 1935    Encounter Date: 2023    Subjective  Patient ID: Kayla Kasper is a 87 y.o. female who is long term resident being seen and evaluated for multiple medical problems.    This patient is not doing well, she has another hospitalization, G-tube is removed, Lai catheter has been removed, she is under hospice care, she continued to retain carbon dioxide, she continued to remain restlessness agitated, after almost 7-8 hospitalization every time patient's condition deteriorated, today when I saw this patient patient's  and daughter were present, patient has been started on lorazepam and morphine, patient is quite restless today morning, she could not tell me exactly what is bothering her but when I palpated her abdomen she was having a lot of pain and discomfort seems like, her eyes are closed, she is noted to communicate, it is very difficult situation and patient's quality of life is quite hampered.  As hospice has been involved patient needs to be kept at at most comfortable state, patient has multiple aspiration pneumonia, multiple hospitalization, GI bleeding, off anticoagulation, catheter associated UTI, aspiration, never walked after first admission in the hospital and that is about 3 months ago.  Very frustrating situation and we are not finding any solution and actually patient's condition is worsening to the point of very poor prognosis, blood sugars is not an issue.         Review of Systems   Constitutional:  Positive for activity change and fatigue.   Respiratory:  Positive for shortness of breath. Negative for cough.    Cardiovascular:  Positive for leg swelling.   Gastrointestinal:  Positive for abdominal pain. Negative for abdominal distention.   Genitourinary:  Negative for difficulty urinating.   Musculoskeletal:  Positive for arthralgias and back pain.   Skin:  Positive for wound.   Neurological:  Positive for weakness.   Psychiatric/Behavioral:   Positive for confusion and sleep disturbance. The patient is nervous/anxious.        Objective  /67   Pulse 98     Physical Exam  Constitutional:       Appearance: Normal appearance but uncomfortable. She is obese.   HENT:      Head: Normocephalic.      Nose: Nose normal.   Eyes:      Conjunctiva/sclera: Conjunctivae normal.   Cardiovascular:      Rate and Rhythm: Normal rate. Rhythm irregular.      Heart sounds: Normal heart sounds.   Pulmonary:      Effort: Pulmonary effort is normal.      Breath sounds: rales RLL.  Abdominal:      Palpations: Abdomen is soft. Tenderness diffuse  Musculoskeletal:         General: Swelling and tenderness present.      Cervical back: painful  range of motion and neck supple.   Skin:     General: Skin is warm and dry.      Findings: Bruising present. Xple sores present  Neurological:      General: No focal deficit present.      Mental Status: She is not oriented to person, place, and time.    Assessment/Plan  Problem List Items Addressed This Visit       CAD (coronary artery disease)    Dementia without behavioral disturbance, psychotic disturbance, mood disturbance, or anxiety, unspecified dementia severity, unspecified dementia type (CMS/[facility])    Aspiration pneumonia (CMS/[facility]) - Primary    Pain     Other Visit Diagnoses       Type 2 diabetes mellitus with diabetic peripheral angiopathy without gangrene, with long-term current use of insulin (CMS/[facility])        Restlessness            Nursing staff was called at bedside, immediately hospice needs to be notified as patient needs to be kept at at most comfort measures, if needed more morphine is to be given, if needed anxiolytic has to be given more often.  Patient cannot be in pain or cannot be restless and uncomfortable.  Prognosis is extremely poor, oral intake remains poor, patient's family were at bedside and they were reassured and I told nursing staff that at any cost hospice has to attend patient as frequently as possible.   Recent hospitalization was reviewed, list of medications are reviewed, extremely poor prognosis and no possibility of this patient to recover or come out of this element.     Goals    None           Electronically Signed By: Johnnie Swanson MD   9/7/23  7:22 PM

## 2023-09-07 NOTE — PROGRESS NOTES
Subjective   Patient ID: Kayla Kasper is a 87 y.o. female who is long term resident being seen and evaluated for multiple medical problems.    This patient is not doing well, she has another hospitalization, G-tube is removed, Lai catheter has been removed, she is under hospice care, she continued to retain carbon dioxide, she continued to remain restlessness agitated, after almost 7-8 hospitalization every time patient's condition deteriorated, today when I saw this patient patient's  and daughter were present, patient has been started on lorazepam and morphine, patient is quite restless today morning, she could not tell me exactly what is bothering her but when I palpated her abdomen she was having a lot of pain and discomfort seems like, her eyes are closed, she is noted to communicate, it is very difficult situation and patient's quality of life is quite hampered.  As hospice has been involved patient needs to be kept at at most comfortable state, patient has multiple aspiration pneumonia, multiple hospitalization, GI bleeding, off anticoagulation, catheter associated UTI, aspiration, never walked after first admission in the hospital and that is about 3 months ago.  Very frustrating situation and we are not finding any solution and actually patient's condition is worsening to the point of very poor prognosis, blood sugars is not an issue.         Review of Systems   Constitutional:  Positive for activity change and fatigue.   Respiratory:  Positive for shortness of breath. Negative for cough.    Cardiovascular:  Positive for leg swelling.   Gastrointestinal:  Positive for abdominal pain. Negative for abdominal distention.   Genitourinary:  Negative for difficulty urinating.   Musculoskeletal:  Positive for arthralgias and back pain.   Skin:  Positive for wound.   Neurological:  Positive for weakness.   Psychiatric/Behavioral:  Positive for confusion and sleep disturbance. The patient is  nervous/anxious.        Objective   /67   Pulse 98     Physical Exam  Constitutional:       Appearance: Normal appearance but uncomfortable. She is obese.   HENT:      Head: Normocephalic.      Nose: Nose normal.   Eyes:      Conjunctiva/sclera: Conjunctivae normal.   Cardiovascular:      Rate and Rhythm: Normal rate. Rhythm irregular.      Heart sounds: Normal heart sounds.   Pulmonary:      Effort: Pulmonary effort is normal.      Breath sounds: rales RLL.  Abdominal:      Palpations: Abdomen is soft. Tenderness diffuse  Musculoskeletal:         General: Swelling and tenderness present.      Cervical back: painful  range of motion and neck supple.   Skin:     General: Skin is warm and dry.      Findings: Bruising present. Xple sores present  Neurological:      General: No focal deficit present.      Mental Status: She is not oriented to person, place, and time.    Assessment/Plan   Problem List Items Addressed This Visit       CAD (coronary artery disease)    Dementia without behavioral disturbance, psychotic disturbance, mood disturbance, or anxiety, unspecified dementia severity, unspecified dementia type (CMS/[facility])    Aspiration pneumonia (CMS/[facility]) - Primary    Pain     Other Visit Diagnoses       Type 2 diabetes mellitus with diabetic peripheral angiopathy without gangrene, with long-term current use of insulin (CMS/[facility])        Restlessness            Nursing staff was called at bedside, immediately hospice needs to be notified as patient needs to be kept at at most comfort measures, if needed more morphine is to be given, if needed anxiolytic has to be given more often.  Patient cannot be in pain or cannot be restless and uncomfortable.  Prognosis is extremely poor, oral intake remains poor, patient's family were at bedside and they were reassured and I told nursing staff that at any cost hospice has to attend patient as frequently as possible.  Recent hospitalization was reviewed, list of medications  are reviewed, extremely poor prognosis and no possibility of this patient to recover or come out of this element.     Goals    None